# Patient Record
Sex: MALE | Race: WHITE | NOT HISPANIC OR LATINO | Employment: FULL TIME | URBAN - METROPOLITAN AREA
[De-identification: names, ages, dates, MRNs, and addresses within clinical notes are randomized per-mention and may not be internally consistent; named-entity substitution may affect disease eponyms.]

---

## 2019-02-04 ENCOUNTER — OFFICE VISIT (OUTPATIENT)
Dept: URGENT CARE | Facility: MEDICAL CENTER | Age: 34
End: 2019-02-04
Payer: COMMERCIAL

## 2019-02-04 VITALS
TEMPERATURE: 98.9 F | WEIGHT: 231.8 LBS | RESPIRATION RATE: 18 BRPM | SYSTOLIC BLOOD PRESSURE: 130 MMHG | HEART RATE: 100 BPM | BODY MASS INDEX: 31.4 KG/M2 | OXYGEN SATURATION: 100 % | DIASTOLIC BLOOD PRESSURE: 98 MMHG | HEIGHT: 72 IN

## 2019-02-04 DIAGNOSIS — R10.9 LEFT FLANK PAIN: Primary | ICD-10-CM

## 2019-02-04 LAB
SL AMB  POCT GLUCOSE, UA: NORMAL
SL AMB LEUKOCYTE ESTERASE,UA: NORMAL
SL AMB POCT BILIRUBIN,UA: NORMAL
SL AMB POCT BLOOD,UA: NORMAL
SL AMB POCT CLARITY,UA: YELLOW
SL AMB POCT COLOR,UA: CLEAR
SL AMB POCT KETONES,UA: NORMAL
SL AMB POCT NITRITE,UA: NORMAL
SL AMB POCT PH,UA: 6
SL AMB POCT SPECIFIC GRAVITY,UA: 1.03
SL AMB POCT URINE PROTEIN: NORMAL
SL AMB POCT UROBILINOGEN: 0.2

## 2019-02-04 PROCEDURE — G0382 LEV 3 HOSP TYPE B ED VISIT: HCPCS | Performed by: PHYSICIAN ASSISTANT

## 2019-02-04 NOTE — PROGRESS NOTES
[facility]      NAME: Ranjit Mujica is a 34 y o  male  : 1985    MRN: [id number]  DATE: 2019  TIME: 6:11 PM    Assessment and Plan   Left flank pain [R10 9]  1  Left flank pain  POCT urine dip       Patient Instructions     Urine dip, was within normal limits on evaluation today  Likely muscular in nature  Advised to continue taking Tylenol/Motrin  Follow up with PCP in 3-5 days  Proceed to  ER if symptoms worsen  Chief Complaint     Chief Complaint   Patient presents with    Flank Pain         History of Present Illness   Melanie Langford presents to the clinic c/o      Patient twisted awkwardly, while on ice 2 days ago  Ever since he had been having left-sided flank pain, she is concerned about  He denies any urinary issues, chest pain, nausea, fever chills  He has never had a kidney stone before, no family history kidney stones  No family history personal history any kidney issues  Denies any dysuria, hematuria  Review of Systems   Review of Systems   Respiratory: Negative  Cardiovascular: Negative  Gastrointestinal: Positive for abdominal pain  Current Medications     No long-term prescriptions on file  Current Allergies     Allergies as of 2019    (No Known Allergies)            The following portions of the patient's history were reviewed and updated as appropriate: allergies, current medications, past family history, past medical history, past social history, past surgical history and problem list     HISTORICAL INFO:  History reviewed  No pertinent past medical history  History reviewed  No pertinent surgical history  Objective   /98   Pulse 100   Temp 98 9 °F (37 2 °C) (Temporal)   Resp 18   Ht 6' (1 829 m)   Wt 105 kg (231 lb 12 8 oz)   SpO2 100%   BMI 31 44 kg/m²        Physical Exam     Physical Exam   Constitutional: He appears well-developed and well-nourished  No distress     HENT:   Head: Normocephalic and atraumatic  Cardiovascular: Normal rate, regular rhythm and normal heart sounds  Pulmonary/Chest: Effort normal and breath sounds normal  No respiratory distress  He has no wheezes  He has no rales  Abdominal: Normal appearance and bowel sounds are normal  There is no tenderness  There is no CVA tenderness  Noted muscle spasm the left lateral abdominal wall  No visible gross deformities  CVA nontender to palpation  Skin: Skin is warm and dry  He is not diaphoretic  Nursing note and vitals reviewed  M*Modal software was used to dictate this note  It may contain errors with dictating incorrect words/spelling  Please contact provider directly for any questions

## 2019-02-04 NOTE — PATIENT INSTRUCTIONS
Flank Pain   WHAT YOU NEED TO KNOW:   Flank pain is felt in the area below your ribcage and above your hip bones, often in the lower back  Your pain may be dull or so severe that you cannot get comfortable  The pain may stay in one area or radiate to another area  It may worsen and lighten in waves  Flank pain is often a sign of problems with your urinary tract, such as a kidney stone or infection  DISCHARGE INSTRUCTIONS:   Return to the emergency department if:   · You have a fever  · Your heart is fluttering or jumping  · You see blood in your urine  · Your pain radiates into your lower abdomen and genital area  · You have intense pain in your low back next to your spine  · You are much more tired than usual and have no desire to eat  · You have a headache and your muscles jerk  Contact your healthcare provider if:   · You have an upset stomach and are vomiting  · You have to urinate more often, and with urgency  · Your pain worsens or does not improve, and you cannot get comfortable  · You pass a stone when you urinate  · You have questions or concerns about your condition or care  Medicines: The following medicines may be ordered for you:  · Pain medicine  may help decrease or relieve your pain  Do not wait until the pain is severe before you take your medicine  · Antibiotics  may help treat a urinary tract infection caused by bacteria  · Take your medicine as directed  Contact your healthcare provider if you think your medicine is not helping or if you have side effects  Tell him of her if you are allergic to any medicine  Keep a list of the medicines, vitamins, and herbs you take  Include the amounts, and when and why you take them  Bring the list or the pill bottles to follow-up visits  Carry your medicine list with you in case of an emergency    Follow up with your healthcare provider in 1 to 2 days or as directed:  Write down your questions so you remember to ask them during your visits  © 2017 [facility] Information is for End User's use only and may not be sold, redistributed or otherwise used for commercial purposes  All illustrations and images included in CareNotes® are the copyrighted property of A D A M , Inc  or [employer]  The above information is an  only  It is not intended as medical advice for individual conditions or treatments  Talk to your doctor, nurse or pharmacist before following any medical regimen to see if it is safe and effective for you

## 2019-02-14 ENCOUNTER — OFFICE VISIT (OUTPATIENT)
Dept: FAMILY MEDICINE CLINIC | Facility: CLINIC | Age: 34
End: 2019-02-14
Payer: COMMERCIAL

## 2019-02-14 VITALS
HEIGHT: 72 IN | DIASTOLIC BLOOD PRESSURE: 92 MMHG | RESPIRATION RATE: 18 BRPM | HEART RATE: 78 BPM | SYSTOLIC BLOOD PRESSURE: 136 MMHG | OXYGEN SATURATION: 97 % | WEIGHT: 234 LBS | BODY MASS INDEX: 31.69 KG/M2

## 2019-02-14 DIAGNOSIS — L72.3 SEBACEOUS CYST: ICD-10-CM

## 2019-02-14 DIAGNOSIS — Z13.220 LIPID SCREENING: ICD-10-CM

## 2019-02-14 DIAGNOSIS — Z76.89 ENCOUNTER TO ESTABLISH CARE: Primary | ICD-10-CM

## 2019-02-14 DIAGNOSIS — Z72.0 NICOTINE ABUSE: ICD-10-CM

## 2019-02-14 DIAGNOSIS — E66.09 CLASS 1 OBESITY DUE TO EXCESS CALORIES WITHOUT SERIOUS COMORBIDITY WITH BODY MASS INDEX (BMI) OF 31.0 TO 31.9 IN ADULT: ICD-10-CM

## 2019-02-14 DIAGNOSIS — F43.8 STRESS-RELATED PHYSIOLOGICAL RESPONSE AFFECTING PHYSICAL CONDITION: ICD-10-CM

## 2019-02-14 PROBLEM — E66.811 CLASS 1 OBESITY DUE TO EXCESS CALORIES WITHOUT SERIOUS COMORBIDITY WITH BODY MASS INDEX (BMI) OF 31.0 TO 31.9 IN ADULT: Status: ACTIVE | Noted: 2019-02-14

## 2019-02-14 PROBLEM — F43.89 STRESS-RELATED PHYSIOLOGICAL RESPONSE AFFECTING PHYSICAL CONDITION: Status: ACTIVE | Noted: 2019-02-14

## 2019-02-14 PROCEDURE — 3008F BODY MASS INDEX DOCD: CPT | Performed by: NURSE PRACTITIONER

## 2019-02-14 PROCEDURE — 99204 OFFICE O/P NEW MOD 45 MIN: CPT | Performed by: NURSE PRACTITIONER

## 2019-02-14 RX ORDER — ALPRAZOLAM 0.25 MG/1
0.25 TABLET ORAL DAILY PRN
Qty: 12 TABLET | Refills: 0 | Status: SHIPPED | OUTPATIENT
Start: 2019-02-14 | End: 2019-05-15

## 2019-02-14 NOTE — PROGRESS NOTES
Assessment/Plan:    No problem-specific Assessment & Plan notes found for this encounter  Diagnoses and all orders for this visit:    Encounter to establish care  -     Comprehensive metabolic panel; Future  -     Lipid panel; Future    Nicotine abuse  -     Comprehensive metabolic panel; Future  -     Lipid panel; Future    Stress-related physiological response affecting physical condition  -     ALPRAZolam (XANAX) 0 25 mg tablet; Take 1 tablet (0 25 mg total) by mouth daily as needed for anxiety for up to 90 days  -     Comprehensive metabolic panel; Future  -     Lipid panel; Future    Sebaceous cyst  -     Ambulatory referral to Dermatology; Future  -     Comprehensive metabolic panel; Future  -     Lipid panel; Future    Class 1 obesity due to excess calories without serious comorbidity with body mass index (BMI) of 31 0 to 31 9 in adult  -     Comprehensive metabolic panel; Future  -     Lipid panel; Future    Lipid screening  -     Comprehensive metabolic panel; Future  -     Lipid panel; Future          Subjective:      Patient ID: Usha Hansen is a 34 y o  male  Patient is here to become Established  He works in IT service  He has some anxiety, usually on Monday mornings  He has some symptoms of anxiety including fleeting nausea and racing thoughts  On Monday am, he has severe anxiety and subsequent nausea  He is then fine the rest of the week  This lasts for about 10 minutes  He works from home  He originally made this appmnt when he had left hip pain, but that was muscle strain and has resolved  He has not had routine labs  He does smoke  He has been using gum and nicotine patches  Pt also has a sebaceous cyst in his groin area that he would like removed  The following portions of the patient's history were reviewed and updated as appropriate:   He  has a past medical history of Anxiety    He   Patient Active Problem List    Diagnosis Date Noted    Lipid screening 02/14/2019    Nicotine abuse 02/14/2019    Stress-related physiological response affecting physical condition 02/14/2019    Sebaceous cyst 02/14/2019    Class 1 obesity due to excess calories without serious comorbidity with body mass index (BMI) of 31 0 to 31 9 in adult 02/14/2019     He  has no past surgical history on file  His family history includes Hypertension in his father  He  reports that he has been smoking  He has a 4 00 pack-year smoking history  He has never used smokeless tobacco  His alcohol and drug histories are not on file  Current Outpatient Medications   Medication Sig Dispense Refill    ALPRAZolam (XANAX) 0 25 mg tablet Take 1 tablet (0 25 mg total) by mouth daily as needed for anxiety for up to 90 days 12 tablet 0     No current facility-administered medications for this visit  No current outpatient medications on file prior to visit  No current facility-administered medications on file prior to visit  He has No Known Allergies       Review of Systems   Constitutional: Negative for fatigue and fever  HENT: Negative for congestion  Eyes: Negative for visual disturbance  Respiratory: Negative for cough and shortness of breath  Cardiovascular: Negative for chest pain, palpitations and leg swelling  Gastrointestinal: Negative for abdominal distention and abdominal pain  Some nausea with vomitting, anxiety related  Endocrine: Negative for cold intolerance, polydipsia and polyuria  Genitourinary: Negative for difficulty urinating  Musculoskeletal: Negative for back pain and joint swelling  Skin: Negative for color change and rash  Sebaceous cyst in groin area   Allergic/Immunologic: Negative for immunocompromised state  Neurological: Negative for dizziness and headaches  Hematological: Negative for adenopathy  Psychiatric/Behavioral: Negative for behavioral problems and sleep disturbance  The patient is nervous/anxious      All other systems reviewed and are negative  Objective:      /92 (BP Location: Left arm, Patient Position: Sitting)   Pulse 78   Resp 18   Ht 6' (1 829 m)   Wt 106 kg (234 lb)   SpO2 97%   BMI 31 74 kg/m²          Physical Exam   Constitutional: He is oriented to person, place, and time  He appears well-developed and well-nourished  No distress  HENT:   Head: Normocephalic and atraumatic  Right Ear: External ear normal    Left Ear: External ear normal    Nose: Nose normal    Mouth/Throat: Oropharynx is clear and moist  No oropharyngeal exudate  Eyes: Pupils are equal, round, and reactive to light  Conjunctivae and EOM are normal  Right eye exhibits no discharge  Left eye exhibits no discharge  No scleral icterus  Neck: Normal range of motion  Neck supple  No JVD present  No thyromegaly present  Cardiovascular: Normal rate, regular rhythm, normal heart sounds and intact distal pulses  Exam reveals no gallop and no friction rub  No murmur heard  Pulmonary/Chest: Effort normal and breath sounds normal  No stridor  No respiratory distress  He has no wheezes  He has no rales  He exhibits no tenderness  Abdominal: Soft  Bowel sounds are normal  He exhibits no distension and no mass  There is no tenderness  There is no guarding  Musculoskeletal: Normal range of motion  He exhibits no edema, tenderness or deformity  Lymphadenopathy:     He has no cervical adenopathy  Neurological: He is alert and oriented to person, place, and time  No cranial nerve deficit  Coordination normal    Skin: Skin is warm and dry  He is not diaphoretic  No erythema  Psychiatric: He has a normal mood and affect  His behavior is normal  Judgment and thought content normal    Nursing note and vitals reviewed

## 2019-02-14 NOTE — PATIENT INSTRUCTIONS
Sebaceous cyst- refer to Dermatology  Borderline high BP- limit salt in diet  Daily exercise and weight loss  Obtain routine labs to check lipids and glucose  Nicotine addiction- pt will consider Chantix, but for now using patches and gum  Obesity- advised to lose 35 pounds  Needs to limit calories and carbohydrates  Encouraged daily exercise  Physiologic stress response- try taking one xanax on Monday mornings  Follow up in 3 months for BP check

## 2020-01-28 LAB
EXTERNAL CHLAMYDIA RESULT: NOT DETECTED
EXTERNAL HIV SCREEN: NORMAL
HCV AB SER-ACNC: NON REACTIVE
N GONORRHOEA RRNA SPEC QL PROBE: NOT DETECTED

## 2021-02-25 ENCOUNTER — OFFICE VISIT (OUTPATIENT)
Dept: URGENT CARE | Facility: CLINIC | Age: 36
End: 2021-02-25
Payer: COMMERCIAL

## 2021-02-25 VITALS
BODY MASS INDEX: 31.74 KG/M2 | OXYGEN SATURATION: 97 % | WEIGHT: 234 LBS | TEMPERATURE: 97.3 F | SYSTOLIC BLOOD PRESSURE: 138 MMHG | HEART RATE: 109 BPM | DIASTOLIC BLOOD PRESSURE: 80 MMHG | RESPIRATION RATE: 16 BRPM

## 2021-02-25 DIAGNOSIS — R10.32 LEFT LOWER QUADRANT ABDOMINAL PAIN: Primary | ICD-10-CM

## 2021-02-25 LAB
SL AMB  POCT GLUCOSE, UA: ABNORMAL
SL AMB LEUKOCYTE ESTERASE,UA: ABNORMAL
SL AMB POCT BILIRUBIN,UA: ABNORMAL
SL AMB POCT BLOOD,UA: ABNORMAL
SL AMB POCT CLARITY,UA: ABNORMAL
SL AMB POCT COLOR,UA: ABNORMAL
SL AMB POCT KETONES,UA: ABNORMAL
SL AMB POCT NITRITE,UA: ABNORMAL
SL AMB POCT PH,UA: 5
SL AMB POCT SPECIFIC GRAVITY,UA: 1.03
SL AMB POCT URINE PROTEIN: 30
SL AMB POCT UROBILINOGEN: 0.2

## 2021-02-25 PROCEDURE — G0382 LEV 3 HOSP TYPE B ED VISIT: HCPCS | Performed by: PHYSICIAN ASSISTANT

## 2021-02-25 PROCEDURE — 81002 URINALYSIS NONAUTO W/O SCOPE: CPT | Performed by: PHYSICIAN ASSISTANT

## 2021-02-25 PROCEDURE — 87086 URINE CULTURE/COLONY COUNT: CPT | Performed by: PHYSICIAN ASSISTANT

## 2021-02-25 RX ORDER — CIPROFLOXACIN 500 MG/1
500 TABLET, FILM COATED ORAL EVERY 12 HOURS SCHEDULED
Qty: 6 TABLET | Refills: 0 | Status: SHIPPED | OUTPATIENT
Start: 2021-02-25 | End: 2021-02-28

## 2021-02-25 NOTE — PROGRESS NOTES
[facility]        NAME: Andreina Dunaway is a 37 y o  male  : 1985    MRN: [id number]  DATE: 2021  TIME: 10:29 AM    Assessment and Plan   Left lower quadrant abdominal pain [R10 32]  1  Left lower quadrant abdominal pain  POCT urine dip    ciprofloxacin (CIPRO) 500 mg tablet    Urine culture         Patient Instructions   Patient Instructions   Abnormal UA, Possible UTI as this presentation of symptoms is similar to past UTIs  Will treat w/ Cipro and confrim with urine culture  Increase oral hydration  Follow up with PCP  Discussed low suspicion for more concerning causes of abdominal pain such as infectious gastroenteritis, diverticulitis, appendicitis, peritonitis  Symptoms reported are improving  Patient aware of worsening symptoms and will go immediately to the nearest ED if new or worsening symptoms develop  Abdominal Pain   WHAT YOU NEED TO KNOW:   Abdominal pain can be dull, achy, or sharp  You may have pain in one area of your abdomen, or in your entire abdomen  Your pain may be caused by a condition such as constipation, food sensitivity or poisoning, infection, or a blockage  Abdominal pain can also be from a hernia, appendicitis, or an ulcer  Liver, gallbladder, or kidney conditions can also cause abdominal pain  The cause of your abdominal pain may be unknown  DISCHARGE INSTRUCTIONS:   Return to the emergency department if:   · You have new chest pain or shortness of breath  · You have pulsing pain in your upper abdomen or lower back that suddenly becomes constant  · Your pain is in the right lower abdominal area and worsens with movement  · You have a fever over 100 4°F (38°C) or shaking chills  · You are vomiting and cannot keep food or liquids down  · Your pain does not improve or gets worse over the next 8 to 12 hours  · You see blood in your vomit or bowel movements, or they look black and tarry       · Your skin or the whites of your eyes turn yellow  · You are a woman and have a large amount of vaginal bleeding that is not your monthly period  Contact your healthcare provider if:   · You have pain in your lower back  · You are a man and have pain in your testicles  · You have pain when you urinate  · You have questions or concerns about your condition or care  Follow up with your healthcare provider within 24 hours or as directed:  Write down your questions so you remember to ask them during your visits  Medicines:   · Medicines  may be given to calm your stomach and prevent vomiting or to decrease pain  Ask how to take pain medicine safely  · Take your medicine as directed  Contact your healthcare provider if you think your medicine is not helping or if you have side effects  Tell him of her if you are allergic to any medicine  Keep a list of the medicines, vitamins, and herbs you take  Include the amounts, and when and why you take them  Bring the list or the pill bottles to follow-up visits  Carry your medicine list with you in case of an emergency  © Copyright [address] Information is for End User's use only and may not be sold, redistributed or otherwise used for commercial purposes  All illustrations and images included in CareNotes® are the copyrighted property of A D A M , Inc  or [address] [place] [address]  The above information is an  only  It is not intended as medical advice for individual conditions or treatments  Talk to your doctor, nurse or pharmacist before following any medical regimen to see if it is safe and effective for you  Follow up with PCP in 3-5 days  Proceed to  ER if symptoms worsen  Chief Complaint     Chief Complaint   Patient presents with    Abdominal Pain     LLQ/pelvic pain x 3 days  Pt had a slight fever yestreday of 100 5  Also felt bloated, abdominal pressure, and gassy   Then had small amount of blood in stool this AM         History of Present Illness 79-year-old male presents to clinic with complaints of left lower abdominal pain x2 days  Reports loss of appetite, dull ache in his left lower quadrant and fever yesterday 100 5° F  He reports his symptoms have been improving but he was concerned as he saw little bit of blood in the toilet this morning  after having a bowel movement  He denies any diarrhea, constipation, body aches or chills, nausea or vomiting , black stools, rectal   He admits to not drinking much water  He reports history of hemorrhoids years ago  He reports having 2 urinary tract infections in the past that were similar to his symptoms today  Denies history of GI disease, abdominal surgeries  He did have 1 episode of burning with urination yesterday  Patient is monogamous and denies concerns for sexually transmitted infections  He denies any recent travel  Review of Systems   Review of Systems   Constitutional: Positive for appetite change, fatigue and fever  Negative for chills, diaphoresis and unexpected weight change  HENT: Negative  Respiratory: Negative for cough and shortness of breath  Cardiovascular: Negative for chest pain  Gastrointestinal: Positive for abdominal pain and blood in stool  Negative for abdominal distention, anal bleeding, constipation, diarrhea, nausea, rectal pain and vomiting  Genitourinary: Positive for dysuria  Negative for difficulty urinating, discharge, flank pain, frequency, genital sores, hematuria, penile pain, penile swelling, scrotal swelling, testicular pain and urgency  Musculoskeletal: Negative for arthralgias and myalgias  Skin: Negative for rash  Neurological: Negative for dizziness, light-headedness and headaches  Hematological: Negative for adenopathy           Current Medications       Current Outpatient Medications:     ALPRAZolam (XANAX) 0 25 mg tablet, Take 1 tablet (0 25 mg total) by mouth daily as needed for anxiety for up to 90 days, Disp: 12 tablet, Rfl: 0    ciprofloxacin (CIPRO) 500 mg tablet, Take 1 tablet (500 mg total) by mouth every 12 (twelve) hours for 3 days, Disp: 6 tablet, Rfl: 0    Current Allergies     Allergies as of 02/25/2021    (No Known Allergies)            The following portions of the patient's history were reviewed and updated as appropriate: allergies, current medications, past family history, past medical history, past social history, past surgical history and problem list      Past Medical History:   Diagnosis Date    Anxiety        History reviewed  No pertinent surgical history  Family History   Problem Relation Age of Onset    Hypertension Father          Medications have been verified  Objective   /80   Pulse (!) 109   Temp (!) 97 3 °F (36 3 °C) (Temporal)   Resp 16   Wt 106 kg (234 lb)   SpO2 97%   BMI 31 74 kg/m²        Physical Exam     Physical Exam  Vitals signs and nursing note reviewed  Constitutional:       General: He is not in acute distress  Appearance: He is well-developed  He is not ill-appearing or diaphoretic  HENT:      Head: Normocephalic and atraumatic  Cardiovascular:      Rate and Rhythm: Regular rhythm  Tachycardia present  Heart sounds: Normal heart sounds  Pulmonary:      Effort: Pulmonary effort is normal       Breath sounds: Normal breath sounds  Abdominal:      General: Abdomen is flat  Bowel sounds are normal  There is no distension  Palpations: Abdomen is soft  There is no mass  Tenderness: There is abdominal tenderness in the suprapubic area and left lower quadrant  There is no right CVA tenderness, left CVA tenderness, guarding or rebound  Skin:     General: Skin is warm and dry  Findings: No rash  Neurological:      Mental Status: He is alert

## 2021-02-25 NOTE — PATIENT INSTRUCTIONS
Abnormal UA, Possible UTI as this presentation of symptoms is similar to past UTIs  Will treat w/ Cipro and confrim with urine culture  Increase oral hydration  Follow up with PCP  Discussed low suspicion for more concerning causes of abdominal pain such as infectious gastroenteritis, diverticulitis, appendicitis, peritonitis  Symptoms reported are improving  Patient aware of worsening symptoms and will go immediately to the nearest ED if new or worsening symptoms develop  Abdominal Pain   WHAT YOU NEED TO KNOW:   Abdominal pain can be dull, achy, or sharp  You may have pain in one area of your abdomen, or in your entire abdomen  Your pain may be caused by a condition such as constipation, food sensitivity or poisoning, infection, or a blockage  Abdominal pain can also be from a hernia, appendicitis, or an ulcer  Liver, gallbladder, or kidney conditions can also cause abdominal pain  The cause of your abdominal pain may be unknown  DISCHARGE INSTRUCTIONS:   Return to the emergency department if:   · You have new chest pain or shortness of breath  · You have pulsing pain in your upper abdomen or lower back that suddenly becomes constant  · Your pain is in the right lower abdominal area and worsens with movement  · You have a fever over 100 4°F (38°C) or shaking chills  · You are vomiting and cannot keep food or liquids down  · Your pain does not improve or gets worse over the next 8 to 12 hours  · You see blood in your vomit or bowel movements, or they look black and tarry  · Your skin or the whites of your eyes turn yellow  · You are a woman and have a large amount of vaginal bleeding that is not your monthly period  Contact your healthcare provider if:   · You have pain in your lower back  · You are a man and have pain in your testicles  · You have pain when you urinate  · You have questions or concerns about your condition or care      Follow up with your healthcare provider within 24 hours or as directed:  Write down your questions so you remember to ask them during your visits  Medicines:   · Medicines  may be given to calm your stomach and prevent vomiting or to decrease pain  Ask how to take pain medicine safely  · Take your medicine as directed  Contact your healthcare provider if you think your medicine is not helping or if you have side effects  Tell him of her if you are allergic to any medicine  Keep a list of the medicines, vitamins, and herbs you take  Include the amounts, and when and why you take them  Bring the list or the pill bottles to follow-up visits  Carry your medicine list with you in case of an emergency  © Copyright [address] Information is for End User's use only and may not be sold, redistributed or otherwise used for commercial purposes  All illustrations and images included in CareNotes® are the copyrighted property of [employer]  or [employer]  The above information is an  only  It is not intended as medical advice for individual conditions or treatments  Talk to your doctor, nurse or pharmacist before following any medical regimen to see if it is safe and effective for you

## 2021-02-26 LAB — BACTERIA UR CULT: NORMAL

## 2021-03-30 DIAGNOSIS — Z23 ENCOUNTER FOR IMMUNIZATION: ICD-10-CM

## 2021-04-01 ENCOUNTER — IMMUNIZATIONS (OUTPATIENT)
Dept: FAMILY MEDICINE CLINIC | Facility: HOSPITAL | Age: 36
End: 2021-04-01

## 2021-04-01 DIAGNOSIS — Z23 ENCOUNTER FOR IMMUNIZATION: Primary | ICD-10-CM

## 2021-04-01 PROCEDURE — 91300 SARS-COV-2 / COVID-19 MRNA VACCINE (PFIZER-BIONTECH) 30 MCG: CPT

## 2021-04-01 PROCEDURE — 0001A SARS-COV-2 / COVID-19 MRNA VACCINE (PFIZER-BIONTECH) 30 MCG: CPT

## 2021-04-24 ENCOUNTER — IMMUNIZATIONS (OUTPATIENT)
Dept: FAMILY MEDICINE CLINIC | Facility: HOSPITAL | Age: 36
End: 2021-04-24

## 2021-04-24 DIAGNOSIS — Z23 ENCOUNTER FOR IMMUNIZATION: Primary | ICD-10-CM

## 2021-04-24 PROCEDURE — 0002A SARS-COV-2 / COVID-19 MRNA VACCINE (PFIZER-BIONTECH) 30 MCG: CPT

## 2021-04-24 PROCEDURE — 91300 SARS-COV-2 / COVID-19 MRNA VACCINE (PFIZER-BIONTECH) 30 MCG: CPT

## 2021-12-28 ENCOUNTER — IMMUNIZATIONS (OUTPATIENT)
Dept: FAMILY MEDICINE CLINIC | Facility: HOSPITAL | Age: 36
End: 2021-12-28

## 2021-12-28 DIAGNOSIS — Z23 ENCOUNTER FOR IMMUNIZATION: Primary | ICD-10-CM

## 2021-12-28 PROCEDURE — 91300 COVID-19 PFIZER VACC 0.3 ML: CPT

## 2021-12-28 PROCEDURE — 0001A COVID-19 PFIZER VACC 0.3 ML: CPT

## 2024-02-21 PROBLEM — Z13.220 LIPID SCREENING: Status: RESOLVED | Noted: 2019-02-14 | Resolved: 2024-02-21

## 2024-04-03 ENCOUNTER — APPOINTMENT (EMERGENCY)
Dept: CT IMAGING | Facility: HOSPITAL | Age: 39
DRG: 720 | End: 2024-04-03
Payer: COMMERCIAL

## 2024-04-03 ENCOUNTER — HOSPITAL ENCOUNTER (INPATIENT)
Facility: HOSPITAL | Age: 39
LOS: 3 days | Discharge: HOME | DRG: 720 | End: 2024-04-07
Attending: EMERGENCY MEDICINE | Admitting: SURGERY
Payer: COMMERCIAL

## 2024-04-03 ENCOUNTER — APPOINTMENT (EMERGENCY)
Dept: RADIOLOGY | Facility: HOSPITAL | Age: 39
DRG: 720 | End: 2024-04-03
Payer: COMMERCIAL

## 2024-04-03 ENCOUNTER — OFFICE VISIT (OUTPATIENT)
Dept: URGENT CARE | Facility: CLINIC | Age: 39
End: 2024-04-03
Payer: COMMERCIAL

## 2024-04-03 VITALS
TEMPERATURE: 96 F | DIASTOLIC BLOOD PRESSURE: 84 MMHG | OXYGEN SATURATION: 98 % | SYSTOLIC BLOOD PRESSURE: 113 MMHG | HEART RATE: 145 BPM

## 2024-04-03 DIAGNOSIS — A41.9 SEPSIS (HCC): Primary | ICD-10-CM

## 2024-04-03 DIAGNOSIS — R10.32 LEFT LOWER QUADRANT ABDOMINAL PAIN: Primary | ICD-10-CM

## 2024-04-03 DIAGNOSIS — K57.92 DIVERTICULITIS: ICD-10-CM

## 2024-04-03 DIAGNOSIS — R10.9 ABDOMINAL PAIN: ICD-10-CM

## 2024-04-03 LAB
ALBUMIN SERPL BCP-MCNC: 4.6 G/DL (ref 3.5–5)
ALP SERPL-CCNC: 111 U/L (ref 34–104)
ALT SERPL W P-5'-P-CCNC: 20 U/L (ref 7–52)
ANION GAP SERPL CALCULATED.3IONS-SCNC: 11 MMOL/L (ref 4–13)
APTT PPP: 34 SECONDS (ref 23–37)
AST SERPL W P-5'-P-CCNC: 10 U/L (ref 13–39)
BACTERIA UR QL AUTO: NORMAL /HPF
BASOPHILS # BLD AUTO: 0.06 THOUSANDS/ÂΜL (ref 0–0.1)
BASOPHILS NFR BLD AUTO: 0 % (ref 0–1)
BILIRUB SERPL-MCNC: 1.83 MG/DL (ref 0.2–1)
BILIRUB UR QL STRIP: NEGATIVE
BNP SERPL-MCNC: 27 PG/ML (ref 0–100)
BUN SERPL-MCNC: 11 MG/DL (ref 5–25)
CALCIUM SERPL-MCNC: 9.2 MG/DL (ref 8.4–10.2)
CARDIAC TROPONIN I PNL SERPL HS: 5 NG/L
CHLORIDE SERPL-SCNC: 100 MMOL/L (ref 96–108)
CLARITY UR: CLEAR
CO2 SERPL-SCNC: 23 MMOL/L (ref 21–32)
COLOR UR: ABNORMAL
CREAT SERPL-MCNC: 1.24 MG/DL (ref 0.6–1.3)
EOSINOPHIL # BLD AUTO: 0.01 THOUSAND/ÂΜL (ref 0–0.61)
EOSINOPHIL NFR BLD AUTO: 0 % (ref 0–6)
ERYTHROCYTE [DISTWIDTH] IN BLOOD BY AUTOMATED COUNT: 12.7 % (ref 11.6–15.1)
FLUAV RNA RESP QL NAA+PROBE: NEGATIVE
FLUBV RNA RESP QL NAA+PROBE: NEGATIVE
GFR SERPL CREATININE-BSD FRML MDRD: 74 ML/MIN/1.73SQ M
GLUCOSE SERPL-MCNC: 123 MG/DL (ref 65–140)
GLUCOSE UR STRIP-MCNC: NEGATIVE MG/DL
HCT VFR BLD AUTO: 47.8 % (ref 36.5–49.3)
HGB BLD-MCNC: 16.4 G/DL (ref 12–17)
HGB UR QL STRIP.AUTO: ABNORMAL
IMM GRANULOCYTES # BLD AUTO: 0.15 THOUSAND/UL (ref 0–0.2)
IMM GRANULOCYTES NFR BLD AUTO: 1 % (ref 0–2)
INR PPP: 1.11 (ref 0.84–1.19)
KETONES UR STRIP-MCNC: ABNORMAL MG/DL
LACTATE SERPL-SCNC: 1.8 MMOL/L (ref 0.5–2)
LEUKOCYTE ESTERASE UR QL STRIP: NEGATIVE
LIPASE SERPL-CCNC: <6 U/L (ref 11–82)
LYMPHOCYTES # BLD AUTO: 2.22 THOUSANDS/ÂΜL (ref 0.6–4.47)
LYMPHOCYTES NFR BLD AUTO: 8 % (ref 14–44)
MCH RBC QN AUTO: 30.1 PG (ref 26.8–34.3)
MCHC RBC AUTO-ENTMCNC: 34.3 G/DL (ref 31.4–37.4)
MCV RBC AUTO: 88 FL (ref 82–98)
MONOCYTES # BLD AUTO: 2.37 THOUSAND/ÂΜL (ref 0.17–1.22)
MONOCYTES NFR BLD AUTO: 9 % (ref 4–12)
NEUTROPHILS # BLD AUTO: 21.56 THOUSANDS/ÂΜL (ref 1.85–7.62)
NEUTS SEG NFR BLD AUTO: 82 % (ref 43–75)
NITRITE UR QL STRIP: NEGATIVE
NON-SQ EPI CELLS URNS QL MICRO: NORMAL /HPF
NRBC BLD AUTO-RTO: 0 /100 WBCS
PH UR STRIP.AUTO: 7 [PH]
PLATELET # BLD AUTO: 267 THOUSANDS/UL (ref 149–390)
PMV BLD AUTO: 10.2 FL (ref 8.9–12.7)
POTASSIUM SERPL-SCNC: 3.9 MMOL/L (ref 3.5–5.3)
PROCALCITONIN SERPL-MCNC: 2.8 NG/ML
PROT SERPL-MCNC: 8.1 G/DL (ref 6.4–8.4)
PROT UR STRIP-MCNC: ABNORMAL MG/DL
PROTHROMBIN TIME: 15 SECONDS (ref 11.6–14.5)
RBC # BLD AUTO: 5.45 MILLION/UL (ref 3.88–5.62)
RBC #/AREA URNS AUTO: NORMAL /HPF
RSV RNA RESP QL NAA+PROBE: NEGATIVE
SARS-COV-2 RNA RESP QL NAA+PROBE: NEGATIVE
SODIUM SERPL-SCNC: 134 MMOL/L (ref 135–147)
SP GR UR STRIP.AUTO: >=1.05 (ref 1–1.03)
UROBILINOGEN UR STRIP-ACNC: 2 MG/DL
WBC # BLD AUTO: 26.37 THOUSAND/UL (ref 4.31–10.16)
WBC #/AREA URNS AUTO: NORMAL /HPF

## 2024-04-03 PROCEDURE — 71045 X-RAY EXAM CHEST 1 VIEW: CPT

## 2024-04-03 PROCEDURE — 99213 OFFICE O/P EST LOW 20 MIN: CPT

## 2024-04-03 PROCEDURE — 96375 TX/PRO/DX INJ NEW DRUG ADDON: CPT

## 2024-04-03 PROCEDURE — 81001 URINALYSIS AUTO W/SCOPE: CPT

## 2024-04-03 PROCEDURE — 84145 PROCALCITONIN (PCT): CPT

## 2024-04-03 PROCEDURE — 36415 COLL VENOUS BLD VENIPUNCTURE: CPT

## 2024-04-03 PROCEDURE — 96365 THER/PROPH/DIAG IV INF INIT: CPT

## 2024-04-03 PROCEDURE — 96361 HYDRATE IV INFUSION ADD-ON: CPT

## 2024-04-03 PROCEDURE — 85025 COMPLETE CBC W/AUTO DIFF WBC: CPT | Performed by: EMERGENCY MEDICINE

## 2024-04-03 PROCEDURE — 83880 ASSAY OF NATRIURETIC PEPTIDE: CPT

## 2024-04-03 PROCEDURE — 83605 ASSAY OF LACTIC ACID: CPT

## 2024-04-03 PROCEDURE — 85610 PROTHROMBIN TIME: CPT

## 2024-04-03 PROCEDURE — S9088 SERVICES PROVIDED IN URGENT: HCPCS

## 2024-04-03 PROCEDURE — 99285 EMERGENCY DEPT VISIT HI MDM: CPT

## 2024-04-03 PROCEDURE — 87040 BLOOD CULTURE FOR BACTERIA: CPT

## 2024-04-03 PROCEDURE — 93005 ELECTROCARDIOGRAM TRACING: CPT

## 2024-04-03 PROCEDURE — 74177 CT ABD & PELVIS W/CONTRAST: CPT

## 2024-04-03 PROCEDURE — 99284 EMERGENCY DEPT VISIT MOD MDM: CPT

## 2024-04-03 PROCEDURE — 84484 ASSAY OF TROPONIN QUANT: CPT

## 2024-04-03 PROCEDURE — 0241U HB NFCT DS VIR RESP RNA 4 TRGT: CPT

## 2024-04-03 PROCEDURE — 83690 ASSAY OF LIPASE: CPT | Performed by: EMERGENCY MEDICINE

## 2024-04-03 PROCEDURE — 85730 THROMBOPLASTIN TIME PARTIAL: CPT

## 2024-04-03 PROCEDURE — 80053 COMPREHEN METABOLIC PANEL: CPT | Performed by: EMERGENCY MEDICINE

## 2024-04-03 RX ORDER — ACETAMINOPHEN 10 MG/ML
1000 INJECTION, SOLUTION INTRAVENOUS ONCE
Status: COMPLETED | OUTPATIENT
Start: 2024-04-03 | End: 2024-04-03

## 2024-04-03 RX ADMIN — PIPERACILLIN AND TAZOBACTAM 4.5 G: 36; 4.5 INJECTION, POWDER, FOR SOLUTION INTRAVENOUS at 23:52

## 2024-04-03 RX ADMIN — ACETAMINOPHEN 1000 MG: 10 INJECTION INTRAVENOUS at 22:42

## 2024-04-03 RX ADMIN — SODIUM CHLORIDE 1000 ML: 0.9 INJECTION, SOLUTION INTRAVENOUS at 23:08

## 2024-04-03 RX ADMIN — IOHEXOL 100 ML: 350 INJECTION, SOLUTION INTRAVENOUS at 22:55

## 2024-04-03 RX ADMIN — SODIUM CHLORIDE 1000 ML: 0.9 INJECTION, SOLUTION INTRAVENOUS at 22:49

## 2024-04-03 NOTE — PROGRESS NOTES
[facility]        NAME: Melanie Langford is a 38 y.o. male  : 1985    MRN: [id number]  DATE: April 3, 2024  TIME: 3:09 PM    Assessment and Plan   Left lower quadrant abdominal pain [R10.32]  1. Left lower quadrant abdominal pain          Mild tenderness to LLQ. HR noted to be elevated. Recommend proceeding to the ER for further evaluation. Patient unsure if he is going to go because the abdominal pain is improving. Concerned with elevated HR.     Patient Instructions     Proceed to the ER for further evaluation.     Chief Complaint     Chief Complaint   Patient presents with    Abdominal Pain     Pt has abdominal pain in his left lower quadrant. Pt said that he feels bloated and swollen in that area and it has happened before twice. Pt said that this pain started yesterday morning. Pt has taken Tums.         History of Present Illness       The patient presents today with complaints of L lower abdominal pain and bloating that started yesterday. He states the pain seems to be improving. Denies fever/chills, diarrhea, or history of GI issues. He states he had this pain previously. He vomited x 1 yesterday. Denies bloody stools but states he has had in the past.         Review of Systems   Review of Systems   Constitutional:  Positive for diaphoresis. Negative for chills and fever.   Cardiovascular:  Negative for chest pain.   Gastrointestinal:  Positive for abdominal distention, abdominal pain and vomiting. Negative for blood in stool, diarrhea and nausea.   Musculoskeletal:  Negative for myalgias.         Current Medications       Current Outpatient Medications:     ALPRAZolam (XANAX) 0.25 mg tablet, Take 1 tablet (0.25 mg total) by mouth daily as needed for anxiety for up to 90 days, Disp: 12 tablet, Rfl: 0    Current Allergies     Allergies as of 2024    (No Known Allergies)            The following portions of the patient's history were reviewed and updated as appropriate: allergies, current  medications, past family history, past medical history, past social history, past surgical history and problem list.     Past Medical History:   Diagnosis Date    Anxiety        History reviewed. No pertinent surgical history.    Family History   Problem Relation Age of Onset    Hypertension Father          Medications have been verified.        Objective   /84 (BP Location: Left arm)   Pulse (!) 145   Temp (!) 96 °F (35.6 °C)   SpO2 98%        Physical Exam     Physical Exam  Vitals and nursing note reviewed.   Constitutional:       General: He is not in acute distress.     Appearance: Normal appearance. He is not ill-appearing.   HENT:      Head: Normocephalic and atraumatic.      Right Ear: Tympanic membrane, ear canal and external ear normal.      Left Ear: Tympanic membrane, ear canal and external ear normal.      Nose: Nose normal. No congestion or rhinorrhea.      Mouth/Throat:      Lips: Pink.      Mouth: Mucous membranes are moist.      Pharynx: No oropharyngeal exudate or posterior oropharyngeal erythema.      Tonsils: No tonsillar exudate.   Eyes:      General: Vision grossly intact.      Extraocular Movements: Extraocular movements intact.      Pupils: Pupils are equal, round, and reactive to light.   Cardiovascular:      Rate and Rhythm: Regular rhythm. Tachycardia present.      Heart sounds: Normal heart sounds. No murmur heard.  Pulmonary:      Effort: Pulmonary effort is normal. No respiratory distress.      Breath sounds: Normal breath sounds. No decreased air movement. No decreased breath sounds, wheezing, rhonchi or rales.   Abdominal:      Tenderness: There is abdominal tenderness (mild) in the left lower quadrant. There is no right CVA tenderness, left CVA tenderness, guarding or rebound.   Musculoskeletal:         General: Normal range of motion.      Cervical back: Normal range of motion.   Lymphadenopathy:      Cervical: No cervical adenopathy.   Skin:     General: Skin is warm.       Findings: No rash.   Neurological:      Mental Status: He is alert and oriented to person, place, and time.      Motor: Motor function is intact.      Gait: Gait is intact.   Psychiatric:         Attention and Perception: Attention normal.         Mood and Affect: Mood normal.

## 2024-04-04 LAB
2HR DELTA HS TROPONIN: -1 NG/L
4HR DELTA HS TROPONIN: -1 NG/L
ABO GROUP BLD: NORMAL
ABO GROUP BLD: NORMAL
ALBUMIN SERPL BCP-MCNC: 3.7 G/DL (ref 3.5–5)
ALP SERPL-CCNC: 90 U/L (ref 34–104)
ALT SERPL W P-5'-P-CCNC: 17 U/L (ref 7–52)
ANION GAP SERPL CALCULATED.3IONS-SCNC: 9 MMOL/L (ref 4–13)
AST SERPL W P-5'-P-CCNC: 11 U/L (ref 13–39)
ATRIAL RATE: 134 BPM
BASOPHILS # BLD AUTO: 0.04 THOUSANDS/ÂΜL (ref 0–0.1)
BASOPHILS NFR BLD AUTO: 0 % (ref 0–1)
BILIRUB SERPL-MCNC: 1.49 MG/DL (ref 0.2–1)
BLD GP AB SCN SERPL QL: NEGATIVE
BUN SERPL-MCNC: 9 MG/DL (ref 5–25)
CALCIUM SERPL-MCNC: 8.4 MG/DL (ref 8.4–10.2)
CARDIAC TROPONIN I PNL SERPL HS: 4 NG/L
CARDIAC TROPONIN I PNL SERPL HS: 4 NG/L
CHLORIDE SERPL-SCNC: 106 MMOL/L (ref 96–108)
CO2 SERPL-SCNC: 22 MMOL/L (ref 21–32)
CREAT SERPL-MCNC: 0.96 MG/DL (ref 0.6–1.3)
EOSINOPHIL # BLD AUTO: 0.01 THOUSAND/ÂΜL (ref 0–0.61)
EOSINOPHIL NFR BLD AUTO: 0 % (ref 0–6)
ERYTHROCYTE [DISTWIDTH] IN BLOOD BY AUTOMATED COUNT: 12.7 % (ref 11.6–15.1)
GFR SERPL CREATININE-BSD FRML MDRD: 101 ML/MIN/1.73SQ M
GLUCOSE SERPL-MCNC: 122 MG/DL (ref 65–140)
HCT VFR BLD AUTO: 41.9 % (ref 36.5–49.3)
HGB BLD-MCNC: 14.4 G/DL (ref 12–17)
IMM GRANULOCYTES # BLD AUTO: 0.12 THOUSAND/UL (ref 0–0.2)
IMM GRANULOCYTES NFR BLD AUTO: 1 % (ref 0–2)
LYMPHOCYTES # BLD AUTO: 1.4 THOUSANDS/ÂΜL (ref 0.6–4.47)
LYMPHOCYTES NFR BLD AUTO: 7 % (ref 14–44)
MAGNESIUM SERPL-MCNC: 1.6 MG/DL (ref 1.9–2.7)
MCH RBC QN AUTO: 29.9 PG (ref 26.8–34.3)
MCHC RBC AUTO-ENTMCNC: 34.4 G/DL (ref 31.4–37.4)
MCV RBC AUTO: 87 FL (ref 82–98)
MONOCYTES # BLD AUTO: 1.47 THOUSAND/ÂΜL (ref 0.17–1.22)
MONOCYTES NFR BLD AUTO: 8 % (ref 4–12)
NEUTROPHILS # BLD AUTO: 16.49 THOUSANDS/ÂΜL (ref 1.85–7.62)
NEUTS SEG NFR BLD AUTO: 84 % (ref 43–75)
NRBC BLD AUTO-RTO: 0 /100 WBCS
P AXIS: 57 DEGREES
PHOSPHATE SERPL-MCNC: 1.6 MG/DL (ref 2.7–4.5)
PLATELET # BLD AUTO: 184 THOUSANDS/UL (ref 149–390)
PLATELET # BLD AUTO: 199 THOUSANDS/UL (ref 149–390)
PMV BLD AUTO: 10 FL (ref 8.9–12.7)
PMV BLD AUTO: 9.9 FL (ref 8.9–12.7)
POTASSIUM SERPL-SCNC: 3.8 MMOL/L (ref 3.5–5.3)
PR INTERVAL: 128 MS
PROT SERPL-MCNC: 6.8 G/DL (ref 6.4–8.4)
QRS AXIS: 96 DEGREES
QRSD INTERVAL: 84 MS
QT INTERVAL: 300 MS
QTC INTERVAL: 448 MS
RBC # BLD AUTO: 4.82 MILLION/UL (ref 3.88–5.62)
RH BLD: POSITIVE
RH BLD: POSITIVE
SODIUM SERPL-SCNC: 137 MMOL/L (ref 135–147)
SPECIMEN EXPIRATION DATE: NORMAL
T WAVE AXIS: -17 DEGREES
VENTRICULAR RATE: 134 BPM
WBC # BLD AUTO: 19.53 THOUSAND/UL (ref 4.31–10.16)

## 2024-04-04 PROCEDURE — 84100 ASSAY OF PHOSPHORUS: CPT | Performed by: SURGERY

## 2024-04-04 PROCEDURE — 93010 ELECTROCARDIOGRAM REPORT: CPT | Performed by: INTERNAL MEDICINE

## 2024-04-04 PROCEDURE — 86850 RBC ANTIBODY SCREEN: CPT | Performed by: SURGERY

## 2024-04-04 PROCEDURE — 85049 AUTOMATED PLATELET COUNT: CPT | Performed by: SURGERY

## 2024-04-04 PROCEDURE — C9113 INJ PANTOPRAZOLE SODIUM, VIA: HCPCS | Performed by: SURGERY

## 2024-04-04 PROCEDURE — 80053 COMPREHEN METABOLIC PANEL: CPT | Performed by: SURGERY

## 2024-04-04 PROCEDURE — 83735 ASSAY OF MAGNESIUM: CPT | Performed by: SURGERY

## 2024-04-04 PROCEDURE — 36415 COLL VENOUS BLD VENIPUNCTURE: CPT

## 2024-04-04 PROCEDURE — 86901 BLOOD TYPING SEROLOGIC RH(D): CPT | Performed by: SURGERY

## 2024-04-04 PROCEDURE — 84484 ASSAY OF TROPONIN QUANT: CPT

## 2024-04-04 PROCEDURE — 86900 BLOOD TYPING SEROLOGIC ABO: CPT | Performed by: SURGERY

## 2024-04-04 PROCEDURE — 85025 COMPLETE CBC W/AUTO DIFF WBC: CPT | Performed by: SURGERY

## 2024-04-04 RX ORDER — PANTOPRAZOLE SODIUM 40 MG/10ML
40 INJECTION, POWDER, LYOPHILIZED, FOR SOLUTION INTRAVENOUS
Status: DISCONTINUED | OUTPATIENT
Start: 2024-04-04 | End: 2024-04-07 | Stop reason: HOSPADM

## 2024-04-04 RX ORDER — HEPARIN SODIUM 5000 [USP'U]/ML
5000 INJECTION, SOLUTION INTRAVENOUS; SUBCUTANEOUS EVERY 8 HOURS SCHEDULED
Status: DISCONTINUED | OUTPATIENT
Start: 2024-04-04 | End: 2024-04-07 | Stop reason: HOSPADM

## 2024-04-04 RX ORDER — SODIUM CHLORIDE, SODIUM LACTATE, POTASSIUM CHLORIDE, CALCIUM CHLORIDE 600; 310; 30; 20 MG/100ML; MG/100ML; MG/100ML; MG/100ML
125 INJECTION, SOLUTION INTRAVENOUS CONTINUOUS
Status: DISCONTINUED | OUTPATIENT
Start: 2024-04-04 | End: 2024-04-05

## 2024-04-04 RX ORDER — ALPRAZOLAM 0.25 MG/1
0.25 TABLET ORAL
Status: DISCONTINUED | OUTPATIENT
Start: 2024-04-04 | End: 2024-04-07 | Stop reason: HOSPADM

## 2024-04-04 RX ORDER — ONDANSETRON 2 MG/ML
4 INJECTION INTRAMUSCULAR; INTRAVENOUS EVERY 6 HOURS PRN
Status: DISCONTINUED | OUTPATIENT
Start: 2024-04-04 | End: 2024-04-07 | Stop reason: HOSPADM

## 2024-04-04 RX ORDER — ACETAMINOPHEN 325 MG/1
975 TABLET ORAL EVERY 8 HOURS PRN
Status: DISCONTINUED | OUTPATIENT
Start: 2024-04-04 | End: 2024-04-07 | Stop reason: HOSPADM

## 2024-04-04 RX ADMIN — PIPERACILLIN AND TAZOBACTAM 4.5 G: 36; 4.5 INJECTION, POWDER, FOR SOLUTION INTRAVENOUS at 05:32

## 2024-04-04 RX ADMIN — HEPARIN SODIUM 5000 UNITS: 5000 INJECTION INTRAVENOUS; SUBCUTANEOUS at 13:35

## 2024-04-04 RX ADMIN — PIPERACILLIN AND TAZOBACTAM 4.5 G: 36; 4.5 INJECTION, POWDER, FOR SOLUTION INTRAVENOUS at 21:31

## 2024-04-04 RX ADMIN — SODIUM CHLORIDE, SODIUM LACTATE, POTASSIUM CHLORIDE, AND CALCIUM CHLORIDE 125 ML/HR: .6; .31; .03; .02 INJECTION, SOLUTION INTRAVENOUS at 14:25

## 2024-04-04 RX ADMIN — HEPARIN SODIUM 5000 UNITS: 5000 INJECTION INTRAVENOUS; SUBCUTANEOUS at 21:31

## 2024-04-04 RX ADMIN — ACETAMINOPHEN 975 MG: 325 TABLET, FILM COATED ORAL at 17:06

## 2024-04-04 RX ADMIN — ACETAMINOPHEN 975 MG: 325 TABLET, FILM COATED ORAL at 08:53

## 2024-04-04 RX ADMIN — PIPERACILLIN AND TAZOBACTAM 4.5 G: 36; 4.5 INJECTION, POWDER, FOR SOLUTION INTRAVENOUS at 13:28

## 2024-04-04 RX ADMIN — ALPRAZOLAM 0.25 MG: 0.25 TABLET ORAL at 17:06

## 2024-04-04 RX ADMIN — PANTOPRAZOLE SODIUM 40 MG: 40 INJECTION, POWDER, FOR SOLUTION INTRAVENOUS at 08:53

## 2024-04-04 RX ADMIN — SODIUM CHLORIDE, SODIUM LACTATE, POTASSIUM CHLORIDE, AND CALCIUM CHLORIDE 125 ML/HR: .6; .31; .03; .02 INJECTION, SOLUTION INTRAVENOUS at 01:21

## 2024-04-04 RX ADMIN — SODIUM CHLORIDE 1000 ML: 0.9 INJECTION, SOLUTION INTRAVENOUS at 00:44

## 2024-04-04 NOTE — ED PROVIDER NOTES
History  Chief Complaint   Patient presents with    Abdominal Pain     LLQ pain starting yesterday increasing in severity today. Patient reports 2 episodes of vomiting today, denies any diarrhea but reports two soft bowel movements today.      Patient is a 38-year-old male who presents to the emergency room for x2 days of abdominal pain.  Patient reports abdominal pain to his left lower quadrant that waxes and wanes, and never completely resolves.  Patient reports that he was seen at urgent care around 2:30 PM today and advised him to go to the ED if symptoms worsen.  Symptoms worsened tonight with associated diaphoresis and nausea.  On initial evaluation, nausea has resolved. Associated 2 soft bowel movements with no blood.  Denies any other symptoms at this time. Tried Tylenol and Tums around 8:30 PM.           Prior to Admission Medications   Prescriptions Last Dose Informant Patient Reported? Taking?   ALPRAZolam (XANAX) 0.25 mg tablet   No No   Sig: Take 1 tablet (0.25 mg total) by mouth daily as needed for anxiety for up to 90 days      Facility-Administered Medications: None       Past Medical History:   Diagnosis Date    Anxiety        History reviewed. No pertinent surgical history.    Family History   Problem Relation Age of Onset    Hypertension Father      I have reviewed and agree with the history as documented.    E-Cigarette/Vaping    E-Cigarette Use Never User      E-Cigarette/Vaping Substances    Nicotine No     THC No     CBD Yes     Flavoring No     Other No     Unknown No      Social History     Tobacco Use    Smoking status: Former     Current packs/day: 0.00     Average packs/day: 0.3 packs/day for 8.0 years (2.0 ttl pk-yrs)     Types: Cigarettes     Quit date: 9/3/2022     Years since quittin.5    Smokeless tobacco: Never   Vaping Use    Vaping status: Never Used   Substance Use Topics    Alcohol use: Yes     Comment: rarely    Drug use: Not Currently       Review of Systems    Constitutional:  Positive for diaphoresis. Negative for chills and fever.   HENT:  Negative for ear pain, sore throat, trouble swallowing and voice change.    Eyes:  Negative for pain and visual disturbance.   Respiratory:  Negative for cough and shortness of breath.    Cardiovascular:  Negative for chest pain and palpitations.   Gastrointestinal:  Positive for abdominal pain and nausea. Negative for blood in stool, constipation, diarrhea and vomiting.   Genitourinary:  Negative for dysuria, flank pain and hematuria.   Musculoskeletal:  Negative for arthralgias and back pain.   Skin:  Negative for color change and rash.   Neurological:  Negative for seizures, syncope and headaches.   Psychiatric/Behavioral:  Negative for confusion.    All other systems reviewed and are negative.      Physical Exam  Physical Exam  Vitals and nursing note reviewed.   Constitutional:       General: He is not in acute distress.     Appearance: He is well-developed.   HENT:      Head: Normocephalic and atraumatic.   Eyes:      Conjunctiva/sclera: Conjunctivae normal.   Cardiovascular:      Rate and Rhythm: Regular rhythm. Tachycardia present.      Heart sounds: No murmur heard.  Pulmonary:      Effort: Pulmonary effort is normal. No respiratory distress.      Breath sounds: Normal breath sounds.   Abdominal:      General: Abdomen is flat.      Palpations: Abdomen is soft.      Tenderness: There is abdominal tenderness in the left upper quadrant and left lower quadrant. There is no right CVA tenderness or left CVA tenderness.   Musculoskeletal:         General: No swelling.      Cervical back: Neck supple.   Skin:     General: Skin is warm and dry.      Capillary Refill: Capillary refill takes less than 2 seconds.   Neurological:      Mental Status: He is alert.   Psychiatric:         Mood and Affect: Mood normal.         Vital Signs  ED Triage Vitals   Temperature Pulse Respirations Blood Pressure SpO2   04/03/24 2147 04/03/24 2147  04/03/24 2147 04/03/24 2147 04/03/24 2147   97.5 °F (36.4 °C) (!) 130 19 147/82 98 %      Temp Source Heart Rate Source Patient Position - Orthostatic VS BP Location FiO2 (%)   04/03/24 2147 04/03/24 2147 04/03/24 2147 04/03/24 2147 --   Oral Monitor Sitting Left arm       Pain Score       04/03/24 2300       4           Vitals:    04/04/24 0000 04/04/24 0015 04/04/24 0115 04/04/24 0230   BP: 141/73 133/99 150/80    Pulse: 105 (!) 107 (!) 112 96   Patient Position - Orthostatic VS: Sitting Sitting Sitting          Visual Acuity      ED Medications  Medications   lactated ringers infusion (125 mL/hr Intravenous New Bag 4/4/24 0121)   ondansetron (ZOFRAN) injection 4 mg (has no administration in time range)   heparin (porcine) subcutaneous injection 5,000 Units (5,000 Units Subcutaneous Not Given 4/4/24 0114)   morphine injection 2 mg (has no administration in time range)   pantoprazole (PROTONIX) injection 40 mg (has no administration in time range)   piperacillin-tazobactam (ZOSYN) IVPB 4.5 g (0 g Intravenous Hold 4/4/24 0113)     Followed by   piperacillin-tazobactam (ZOSYN) IVPB (EXTENDED INFUSION) 4.5 g (has no administration in time range)   ALPRAZolam (XANAX) tablet 0.25 mg (has no administration in time range)   acetaminophen (TYLENOL) tablet 975 mg (has no administration in time range)   sodium chloride 0.9 % bolus 1,000 mL (0 mL Intravenous Stopped 4/4/24 0056)   acetaminophen (Ofirmev) injection 1,000 mg (0 mg Intravenous Stopped 4/3/24 2257)   sodium chloride 0.9 % bolus 1,000 mL (0 mL Intravenous Stopped 4/3/24 2351)   piperacillin-tazobactam (ZOSYN) IVPB 4.5 g (0 g Intravenous Stopped 4/4/24 0028)   iohexol (OMNIPAQUE) 350 MG/ML injection (MULTI-DOSE) 100 mL (100 mL Intravenous Given 4/3/24 2255)   sodium chloride 0.9 % bolus 1,000 mL (0 mL Intravenous Stopped 4/4/24 0152)       Diagnostic Studies  Results Reviewed       Procedure Component Value Units Date/Time    Platelet count [[id number]]  (Normal)  Collected: 04/04/24 0237    Lab Status: Final result Specimen: Blood from Arm, Right Updated: 04/04/24 0245     Platelets 184 Thousands/uL      MPV 10.0 fL     HS Troponin I 4hr [[id number]] Collected: 04/04/24 0237    Lab Status: In process Specimen: Blood from Arm, Right Updated: 04/04/24 0242    HS Troponin I 2hr [[id number]]  (Normal) Collected: 04/04/24 0043    Lab Status: Final result Specimen: Blood from Arm, Right Updated: 04/04/24 0111     hs TnI 2hr 4 ng/L      Delta 2hr hsTnI -1 ng/L     Urine Microscopic [[id number]]  (Normal) Collected: 04/03/24 2336    Lab Status: Final result Specimen: Urine, Clean Catch Updated: 04/03/24 2346     RBC, UA 1-2 /hpf      WBC, UA None Seen /hpf      Epithelial Cells None Seen /hpf      Bacteria, UA None Seen /hpf     UA w Reflex to Microscopic w Reflex to Culture [[id number]]  (Abnormal) Collected: 04/03/24 2336    Lab Status: Final result Specimen: Urine, Clean Catch Updated: 04/03/24 2346     Color, UA Light Yellow     Clarity, UA Clear     Specific Gravity, UA >=1.050     pH, UA 7.0     Leukocytes, UA Negative     Nitrite, UA Negative     Protein, UA 50 (1+) mg/dl      Glucose, UA Negative mg/dl      Ketones, UA 20 (1+) mg/dl      Urobilinogen, UA 2.0 mg/dl      Bilirubin, UA Negative     Occult Blood, UA Trace    FLU/RSV/COVID - if FLU/RSV clinically relevant [[id number]]  (Normal) Collected: 04/03/24 2241    Lab Status: Final result Specimen: Nares from Nose Updated: 04/03/24 2327     SARS-CoV-2 Negative     INFLUENZA A PCR Negative     INFLUENZA B PCR Negative     RSV PCR Negative    Narrative:      FOR PEDIATRIC PATIENTS - copy/paste COVID Guidelines URL to browser: [email or web address]    SARS-CoV-2 assay is a Nucleic Acid Amplification assay intended for the  qualitative detection of nucleic acid from SARS-CoV-2 in nasopharyngeal  swabs. Results are for the presumptive identification of SARS-CoV-2 RNA.    Positive  results are indicative of infection with SARS-CoV-2, the virus  causing COVID-19, but do not rule out bacterial infection or co-infection  with other viruses. Laboratories within the [place] and its  territories are required to report all positive results to the appropriate  public health authorities. Negative results do not preclude SARS-CoV-2  infection and should not be used as the sole basis for treatment or other  patient management decisions. Negative results must be combined with  clinical observations, patient history, and epidemiological information.  This test has not been FDA cleared or approved.    This test has been authorized by FDA under an Emergency Use Authorization  (EUA). This test is only authorized for the duration of time the  declaration that circumstances exist justifying the authorization of the  emergency use of an in vitro diagnostic tests for detection of SARS-CoV-2  virus and/or diagnosis of COVID-19 infection under section 564(b)(1) of  the Act, 21 U.S.C. 360bbb-3(b)(1), unless the authorization is terminated  or revoked sooner. The test has been validated but independent review by FDA  and CLIA is pending.    Test performed using [employer] GeneXpert: This RT-PCR assay targets N2,  a region unique to SARS-CoV-2. A conserved region in the E-gene was chosen  for pan-Sarbecovirus detection which includes SARS-CoV-2.    According to CMS-2020-01-R, this platform meets the definition of high-throughput technology.    Procalcitonin [[id number]]  (Abnormal) Collected: 04/03/24 2241    Lab Status: Final result Specimen: Blood from Arm, Right Updated: 04/03/24 2317     Procalcitonin 2.80 ng/ml     HS Troponin 0hr (reflex protocol) [[id number]]  (Normal) Collected: 04/03/24 2241    Lab Status: Final result Specimen: Blood from Arm, Right Updated: 04/03/24 2316     hs TnI 0hr 5 ng/L     B-Type Natriuretic Peptide(BNP) [[id number]]  (Normal) Collected: 04/03/24 2241    Lab Status: Final result  Specimen: Blood from Arm, Right Updated: 04/03/24 2313     BNP 27 pg/mL     Lactic acid [[id number]]  (Normal) Collected: 04/03/24 2241    Lab Status: Final result Specimen: Blood from Arm, Right Updated: 04/03/24 2311     LACTIC ACID 1.8 mmol/L     Narrative:      Result may be elevated if tourniquet was used during collection.    Protime-INR [[id number]]  (Abnormal) Collected: 04/03/24 2241    Lab Status: Final result Specimen: Blood from Arm, Right Updated: 04/03/24 2306     Protime 15.0 seconds      INR 1.11    APTT [[id number]]  (Normal) Collected: 04/03/24 2241    Lab Status: Final result Specimen: Blood from Arm, Right Updated: 04/03/24 2306     PTT 34 seconds     Blood culture #1 [[id number]] Collected: 04/03/24 2251    Lab Status: In process Specimen: Blood from Arm, Left Updated: 04/03/24 2254    Blood culture #2 [[id number]] Collected: 04/03/24 2241    Lab Status: In process Specimen: Blood from Arm, Right Updated: 04/03/24 2247    Lipase [[id number]]  (Abnormal) Collected: 04/03/24 2151    Lab Status: Final result Specimen: Blood from Arm, Right Updated: 04/03/24 2216     Lipase <6 u/L     Comprehensive metabolic panel [[id number]]  (Abnormal) Collected: 04/03/24 2151    Lab Status: Final result Specimen: Blood from Arm, Right Updated: 04/03/24 2216     Sodium 134 mmol/L      Potassium 3.9 mmol/L      Chloride 100 mmol/L      CO2 23 mmol/L      ANION GAP 11 mmol/L      BUN 11 mg/dL      Creatinine 1.24 mg/dL      Glucose 123 mg/dL      Calcium 9.2 mg/dL      AST 10 U/L      ALT 20 U/L      Alkaline Phosphatase 111 U/L      Total Protein 8.1 g/dL      Albumin 4.6 g/dL      Total Bilirubin 1.83 mg/dL      eGFR 74 ml/min/1.73sq m     Narrative:      National Kidney Disease Foundation guidelines for Chronic Kidney Disease (CKD):     Stage 1 with normal or high GFR (GFR > 90 mL/min/1.73 square meters)    Stage 2 Mild CKD (GFR = 60-89 mL/min/1.73 square meters)    Stage 3A Moderate CKD (GFR = 45-59 mL/min/1.73 square  meters)    Stage 3B Moderate CKD (GFR = 30-44 mL/min/1.73 square meters)    Stage 4 Severe CKD (GFR = 15-29 mL/min/1.73 square meters)    Stage 5 End Stage CKD (GFR <15 mL/min/1.73 square meters)  Note: GFR calculation is accurate only with a steady state creatinine    CBC and differential [[id number]]  (Abnormal) Collected: 04/03/24 2151    Lab Status: Final result Specimen: Blood from Arm, Right Updated: 04/03/24 2202     WBC 26.37 Thousand/uL      RBC 5.45 Million/uL      Hemoglobin 16.4 g/dL      Hematocrit 47.8 %      MCV 88 fL      MCH 30.1 pg      MCHC 34.3 g/dL      RDW 12.7 %      MPV 10.2 fL      Platelets 267 Thousands/uL      nRBC 0 /100 WBCs      Neutrophils Relative 82 %      Immature Grans % 1 %      Lymphocytes Relative 8 %      Monocytes Relative 9 %      Eosinophils Relative 0 %      Basophils Relative 0 %      Neutrophils Absolute 21.56 Thousands/µL      Absolute Immature Grans 0.15 Thousand/uL      Absolute Lymphocytes 2.22 Thousands/µL      Absolute Monocytes 2.37 Thousand/µL      Eosinophils Absolute 0.01 Thousand/µL      Basophils Absolute 0.06 Thousands/µL                    XR chest portable   ED Interpretation by Hodan Gay PA-C (04/03 4826)   No acute cardiopulmonary abnormality      CT abdomen pelvis with contrast   Final Result by Gage Mckoy MD (04/03 0571)      1.  Acute perforated diverticulitis of the splenic flexure/proximal descending colon as detailed above. Recommend surgical consultation.   2.  Mild diffuse hepatic steatosis.   3.  Indeterminate 2.6 x 1.9 cm left adrenal intermediate density nodule. Consider nonemergent adrenal washout CT or chemical shift MRI for further characterization.      Findings discussed with JULIAN Gay at 11:54 p.m.      Workstation performed: [id number]                    Procedures  ECG 12 Lead Documentation Only    Date/Time: 4/3/2024 9:50 PM    Performed by: Hodan Gay PA-C  Authorized by: Hodan Gay PA-C    Indications /  Diagnosis:  Sepsis work-up  ECG reviewed by me, the ED Provider: yes    Patient location:  ED  Rate:     ECG rate:  134    ECG rate assessment: tachycardic    Rhythm:     Rhythm: sinus tachycardia    QRS:     QRS axis:  Right  ST segments:     ST segments:  Normal  T waves:     T waves: normal             ED Course  ED Course as of 04/04/24 0250   Wed Apr 03, 2024 2212 WBC(!): 26.37   2217 ALK PHOS(!): 111   2217 Total Bilirubin(!): 1.83   2312 Pulse(!): 120   2318 Procalcitonin(!): 2.80   2349 Blood, UA(!): Trace   2349 Urobilinogen, UA(!): 2.0   2350 Ketones, UA(!): 20 (1+)   2351 Pulse: 104  Improvement after IV fluids.      Thu Apr 04, 2024   0000 CT abdomen pelvis with contrast  IMPRESSION:     1.  Acute perforated diverticulitis of the splenic flexure/proximal descending colon as detailed above. Recommend surgical consultation.  2.  Mild diffuse hepatic steatosis.  3.  Indeterminate 2.6 x 1.9 cm left adrenal intermediate density nodule. Consider nonemergent adrenal washout CT or chemical shift MRI for further characterization.                  HEART Risk Score      Flowsheet Row Most Recent Value   Heart Score Risk Calculator    History 0 Filed at: 04/03/2024 2315   ECG 1 Filed at: 04/03/2024 2315   Age 0 Filed at: 04/03/2024 2315   Risk Factors 1 Filed at: 04/03/2024 2315   Troponin 0 Filed at: 04/03/2024 2315   HEART Score 2 Filed at: 04/03/2024 2315                          SBIRT 22yo+      Flowsheet Row Most Recent Value   Initial Alcohol Screen: US AUDIT-C     1. How often do you have a drink containing alcohol? 1 Filed at: 04/03/2024 2212   2. How many drinks containing alcohol do you have on a typical day you are drinking?  1 Filed at: 04/03/2024 2212   3b. FEMALE Any Age, or MALE 65+: How often do you have 4 or more drinks on one occassion? 0 Filed at: 04/03/2024 2212   Audit-C Score 2 Filed at: 04/03/2024 2212   CLIFFORD: How many times in the past year have you...    Used an illegal drug or used a  "prescription medication for non-medical reasons? Never Filed at: 04/03/2024 2212                      Medical Decision Making  38-year-old male presenting for x2 days LLQ pain. Associated diaphoresis and nausea. On arrival heart rate in the 130s. Other vital signs stable. On exam, patient ill-appearing and very diaphoretic with associated tenderness to his left-sided abdomen. EKG NSR with sinus tachycardia with no acute ischemic changes. WBC 26.37. Increased total bilirubin 1.83, and alk phos 111.  Procalcitonin 2.80.  Trace blood on UA with associated 1+ ketones. Symptom improvement after fluids, IV acetaminophen. Patient started on IV Zosyn. CT shows, \"IMPRESSION:    1. Acute perforated diverticulitis of the splenic flexure/proximal descending colon as detailed above. Recommend surgical consultation.  2. Mild diffuse hepatic steatosis.  3. Indeterminate 2.6 x 1.9 cm left adrenal intermediate density nodule. Consider nonemergent adrenal washout CT or chemical shift MRI for further characterization\".  Discussed with general surgery.  Patient to be admitted to their service and they will see him tomorrow.  Discussed admission plan and ED course with patient.  Patient understands and consents to admission at this time.  All questions answered appropriately.    Amount and/or Complexity of Data Reviewed  Labs: ordered. Decision-making details documented in ED Course.  Radiology: ordered and independent interpretation performed. Decision-making details documented in ED Course.    Risk  Prescription drug management.             Disposition  Final diagnoses:   Abdominal pain   Sepsis (HCC)   Diverticulitis     Time reflects when diagnosis was documented in both MDM as applicable and the Disposition within this note       Time User Action Codes Description Comment    4/3/2024 11:53 PM Hodan Gay Add [R10.9] Abdominal pain     4/3/2024 11:54 PM Hodan Gay Add [A41.9] Sepsis (HCC)     4/3/2024 11:54 PM Hodan Gay " Add [K57.92] Diverticulitis     4/3/2024 11:55 PM Hodan Gay Modify [R10.9] Abdominal pain     4/3/2024 11:55 PM Hodan Gay [A41.9] Sepsis (HCC)           ED Disposition       None          Follow-up Information    None         Patient's Medications   Discharge Prescriptions    No medications on file       No discharge procedures on file.    PDMP Review       None            ED Provider  Electronically Signed by             Hodan Gay PA-C  04/04/24 0250

## 2024-04-04 NOTE — H&P
H&P Exam - General Surgery   Melanie Langford 38 y.o. male MRN: [id number]  Unit/Bed#: ED 16 Encounter: [id number]    Assessment/Plan     Melanie Langford is a 38 y.o. male    Acute diverticulitis at splenic flexure/proximal descending colon with perforation   SIRS meeting sepsis criteria 2/2 acute diverticulitis give   CT of the abdomen and pelvis showed acute perforated diverticulitis of the splenic flexure/proximal descending colon with abnormal inflammatory debris extending inferiorly along the retroperitoneum into the left lower quadrant and left pelvic region. Several foci of free air adjacent to the posterior wall of the proximal descending colon in the region of a enlarged inflamed diverticula, no drainable fluid collection/abscess    No indication for emergent surgical intervention at this time. The patient does  meet sepsis criteria with fever of 101.5 this AM and WBC 19.53, -120 during examination. At this time, he does remain hemodynamically stable and states his abdominal pain is markedly improved at this time. He denies any nausea or emesis, denies any flank or back pain, and states he is passing flatus and did have a bowel movement yesterday evening. On examination, his abdomen is soft, nondistended, nontender to palpation, no guarding or rebound, no peritoneal signs, no flank tenderness or CVA tenderness. No skin changes along abdomen or sides/back noted.     At this time, will attempt to treat perforated diverticulitis with nonoperative management with bowel rest and IV antibiotics. If patient fails to demonstrate any significant improvement in next 24 hours, surgical intervention vs repeat imaging to assess for drainable collection will be recommended. This was discussed with the patient who expresses understanding   Close monitoring of fever curve and HR at this time given persistent tachycardia, of note, patient HR was documented to be 145 at urgent care yesterday afternoon.  IV Zosyn for  antimicrobial coverage, follow up on blood cultures  Trend labs, monitor Hb, WBC, and vitals   I/Os  Serial abdominal exams  Encourage ambulation/OOB  Pain control/Antiemetics PRN  IS 10x an hour  DVT prophylaxis       History of Present Illness     HPI:  Melanie Langford is a 38 y.o. male who presents with a two day history of abdominal pain. The patient states the pain is located on his lower left side and has been waxing and waning over the last several days but states it never fully resolves. He notes that he was evaluated at an urgent care and he was recommended to go to the ED given findings of LLQ pain and tachycardia. The patient states that at the time, he was feeling overall fairly well and did not go, but states he developed worsening pain and bloating overnight which prompted him to seek medical attention. CT of the abdomen and pelvis showed acute diverticulitis at the sigmoid flexure/descending colon with evidence of perforation. General surgery was subsequently consulted. The patient states he has had two prior episodes of similar but far more mild pain in the last several years but notes the first resolved with a Zpac and the 2nd resolved prior to seeking medical attention. The patient denies any prior pertinent medical or surgical history. The patient states he has never had a colonoscopy.     Review of Systems   Constitutional:  Positive for fever. Negative for activity change, fatigue and unexpected weight change.   HENT:  Negative for congestion, sore throat, tinnitus, trouble swallowing and voice change.    Eyes:  Negative for photophobia, discharge and visual disturbance.   Respiratory:  Negative for apnea, cough, chest tightness, shortness of breath and wheezing.    Cardiovascular:  Negative for chest pain, palpitations and leg swelling.   Gastrointestinal:  Positive for abdominal pain and nausea. Negative for abdominal distention, diarrhea and vomiting.   Endocrine: Negative for cold intolerance,  polyphagia and polyuria.   Genitourinary:  Negative for decreased urine volume, difficulty urinating, flank pain, frequency, hematuria and urgency.   Musculoskeletal:  Negative for arthralgias, back pain, joint swelling and myalgias.   Skin:  Negative for color change, pallor, rash and wound.   Neurological:  Negative for dizziness, seizures, facial asymmetry, light-headedness and numbness.   Psychiatric/Behavioral:  Negative for agitation and behavioral problems.        Historical Information   Past Medical History:   Diagnosis Date    Anxiety      History reviewed. No pertinent surgical history.  Social History   Social History     Substance and Sexual Activity   Alcohol Use Yes    Comment: rarely     Social History     Substance and Sexual Activity   Drug Use Not Currently     Social History     Tobacco Use   Smoking Status Former    Current packs/day: 0.00    Average packs/day: 0.3 packs/day for 8.0 years (2.0 ttl pk-yrs)    Types: Cigarettes    Quit date: 9/3/2022    Years since quittin.5   Smokeless Tobacco Never     Family History:   Family History   Problem Relation Age of Onset    Hypertension Father        Meds/Allergies   PTA meds:   Prior to Admission Medications   Prescriptions Last Dose Informant Patient Reported? Taking?   ALPRAZolam (XANAX) 0.25 mg tablet   No No   Sig: Take 1 tablet (0.25 mg total) by mouth daily as needed for anxiety for up to 90 days      Facility-Administered Medications: None     No Known Allergies    Objective   First Vitals:   Blood Pressure: 147/82 (24)  Pulse: (!) 130 (24)  Temperature: 97.5 °F (36.4 °C) (24)  Temp Source: Oral (24)  Respirations: 19 (24)  Height: 6' (182.9 cm) (24)  Weight - Scale: 106 kg (234 lb 2.1 oz) (24)  SpO2: 98 % (24)    Current Vitals:   Blood Pressure: 138/63 (24)  Pulse: (!) 120 (24)  Temperature: (!) 101.5 °F (38.6 °C) (24  0835)  Temp Source: Oral (04/04/24 0835)  Respirations: 16 (04/04/24 0845)  Height: 6' (182.9 cm) (04/03/24 2249)  Weight - Scale: 106 kg (234 lb 2.1 oz) (04/03/24 2249)  SpO2: 95 % (04/04/24 0845)      Intake/Output Summary (Last 24 hours) at 4/4/2024 0906  Last data filed at 4/4/2024 0835  Gross per 24 hour   Intake 100 ml   Output 1600 ml   Net -1500 ml       Invasive Devices       Peripheral Intravenous Line  Duration             Peripheral IV 04/03/24 Proximal;Right;Ventral (anterior) Forearm <1 day    Peripheral IV 04/04/24 Distal;Dorsal (posterior);Left Forearm <1 day                    Physical Exam  Constitutional:       General: He is not in acute distress.     Appearance: He is well-developed. He is not diaphoretic.   HENT:      Head: Normocephalic and atraumatic.      Right Ear: External ear normal.      Left Ear: External ear normal.      Mouth/Throat:      Pharynx: No oropharyngeal exudate.   Eyes:      Conjunctiva/sclera: Conjunctivae normal.      Pupils: Pupils are equal, round, and reactive to light.   Neck:      Thyroid: No thyromegaly.      Trachea: No tracheal deviation.   Cardiovascular:      Rate and Rhythm: Normal rate and regular rhythm.      Heart sounds: Normal heart sounds. No murmur heard.     No friction rub. No gallop.   Pulmonary:      Effort: Pulmonary effort is normal. No respiratory distress.      Breath sounds: Normal breath sounds. No wheezing or rales.   Chest:      Chest wall: No tenderness.   Abdominal:      General: Bowel sounds are normal. There is no distension.      Palpations: Abdomen is soft.      Tenderness: There is no abdominal tenderness. There is no guarding or rebound.      Comments: Abdomen is soft, nondistended, normoactive bowel sounds, nontender to light or deep palpation, no skin changes along abdomen, sides, or back, no CVA tenderness, no flank tenderness    Musculoskeletal:         General: No tenderness or deformity. Normal range of motion.      Cervical  back: Normal range of motion and neck supple.   Skin:     General: Skin is warm and dry.      Coloration: Skin is not pale.      Findings: No erythema or rash.   Neurological:      Mental Status: He is alert and oriented to person, place, and time.   Psychiatric:         Behavior: Behavior normal.         Thought Content: Thought content normal.         Lab Results: I have personally reviewed pertinent lab results.    Recent Results (from the past 36 hour(s))   ECG 12 lead    Collection Time: 04/03/24  9:50 PM   Result Value Ref Range    Ventricular Rate 134 BPM    Atrial Rate 134 BPM    NH Interval 128 ms    QRSD Interval 84 ms    QT Interval 300 ms    QTC Interval 448 ms    P [place] 57 degrees    QRS Axis 96 degrees    T Wave Axis -17 degrees   CBC and differential    Collection Time: 04/03/24  9:51 PM   Result Value Ref Range    WBC 26.37 (H) 4.31 - 10.16 Thousand/uL    RBC 5.45 3.88 - 5.62 Million/uL    Hemoglobin 16.4 12.0 - 17.0 g/dL    Hematocrit 47.8 36.5 - 49.3 %    MCV 88 82 - 98 fL    MCH 30.1 26.8 - 34.3 pg    MCHC 34.3 31.4 - 37.4 g/dL    RDW 12.7 11.6 - 15.1 %    MPV 10.2 8.9 - 12.7 fL    Platelets 267 149 - 390 Thousands/uL    nRBC 0 /100 WBCs    Neutrophils Relative 82 (H) 43 - 75 %    Immature Grans % 1 0 - 2 %    Lymphocytes Relative 8 (L) 14 - 44 %    Monocytes Relative 9 4 - 12 %    Eosinophils Relative 0 0 - 6 %    Basophils Relative 0 0 - 1 %    Neutrophils Absolute 21.56 (H) 1.85 - 7.62 Thousands/µL    Absolute Immature Grans 0.15 0.00 - 0.20 Thousand/uL    Absolute Lymphocytes 2.22 0.60 - 4.47 Thousands/µL    Absolute Monocytes 2.37 (H) 0.17 - 1.22 Thousand/µL    Eosinophils Absolute 0.01 0.00 - 0.61 Thousand/µL    Basophils Absolute 0.06 0.00 - 0.10 Thousands/µL   Comprehensive metabolic panel    Collection Time: 04/03/24  9:51 PM   Result Value Ref Range    Sodium 134 (L) 135 - 147 mmol/L    Potassium 3.9 3.5 - 5.3 mmol/L    Chloride 100 96 - 108 mmol/L    CO2 23 21 - 32 mmol/L    ANION GAP  "11 4 - 13 mmol/L    BUN 11 5 - 25 mg/dL    Creatinine 1.24 0.60 - 1.30 mg/dL    Glucose 123 65 - 140 mg/dL    Calcium 9.2 8.4 - 10.2 mg/dL    AST 10 (L) 13 - 39 U/L    ALT 20 7 - 52 U/L    Alkaline Phosphatase 111 (H) 34 - 104 U/L    Total Protein 8.1 6.4 - 8.4 g/dL    Albumin 4.6 3.5 - 5.0 g/dL    Total Bilirubin 1.83 (H) 0.20 - 1.00 mg/dL    eGFR 74 ml/min/1.73sq m   Lipase    Collection Time: 04/03/24  9:51 PM   Result Value Ref Range    Lipase <6 (L) 11 - 82 u/L   Lactic acid    Collection Time: 04/03/24 10:41 PM   Result Value Ref Range    LACTIC ACID 1.8 0.5 - 2.0 mmol/L   Procalcitonin    Collection Time: 04/03/24 10:41 PM   Result Value Ref Range    Procalcitonin 2.80 (H) <=0.25 ng/ml   Protime-INR    Collection Time: 04/03/24 10:41 PM   Result Value Ref Range    Protime 15.0 (H) 11.6 - 14.5 seconds    INR 1.11 0.84 - 1.19   APTT    Collection Time: 04/03/24 10:41 PM   Result Value Ref Range    PTT 34 23 - 37 seconds   B-Type Natriuretic Peptide(BNP)    Collection Time: 04/03/24 10:41 PM   Result Value Ref Range    BNP 27 0 - 100 pg/mL   FLU/RSV/COVID - if FLU/RSV clinically relevant    Collection Time: 04/03/24 10:41 PM    Specimen: Nose; Nares   Result Value Ref Range    SARS-CoV-2 Negative Negative    INFLUENZA A PCR Negative Negative    INFLUENZA B PCR Negative Negative    RSV PCR Negative Negative   HS Troponin 0hr (reflex protocol)    Collection Time: 04/03/24 10:41 PM   Result Value Ref Range    hs TnI 0hr 5 \"Refer to ACS Flowchart\"- see link ng/L   UA w Reflex to Microscopic w Reflex to Culture    Collection Time: 04/03/24 11:36 PM    Specimen: Urine, Clean Catch   Result Value Ref Range    Color, UA Light Yellow     Clarity, UA Clear     Specific Gravity, UA >=1.050 (H) 1.003 - 1.030    pH, UA 7.0 4.5, 5.0, 5.5, 6.0, 6.5, 7.0, 7.5, 8.0    Leukocytes, UA Negative Negative    Nitrite, UA Negative Negative    Protein, UA 50 (1+) (A) Negative mg/dl    Glucose, UA Negative Negative mg/dl    Ketones, UA " "20 (1+) (A) Negative mg/dl    Urobilinogen, UA 2.0 (A) <2.0 mg/dl mg/dl    Bilirubin, UA Negative Negative    Occult Blood, UA Trace (A) Negative   Urine Microscopic    Collection Time: 04/03/24 11:36 PM   Result Value Ref Range    RBC, UA 1-2 None Seen, 1-2 /hpf    WBC, UA None Seen None Seen, 1-2 /hpf    Epithelial Cells None Seen None Seen, Occasional /hpf    Bacteria, UA None Seen None Seen, Occasional /hpf   HS Troponin I 2hr    Collection Time: 04/04/24 12:43 AM   Result Value Ref Range    hs TnI 2hr 4 \"Refer to ACS Flowchart\"- see link ng/L    Delta 2hr hsTnI -1 <20 ng/L   HS Troponin I 4hr    Collection Time: 04/04/24  2:37 AM   Result Value Ref Range    hs TnI 4hr 4 \"Refer to ACS Flowchart\"- see link ng/L    Delta 4hr hsTnI -1 <20 ng/L   Platelet count    Collection Time: 04/04/24  2:37 AM   Result Value Ref Range    Platelets 184 149 - 390 Thousands/uL    MPV 10.0 8.9 - 12.7 fL   Type and screen    Collection Time: 04/04/24  4:29 AM   Result Value Ref Range    ABO Grouping O     Rh Factor Positive     Antibody Screen Negative     Specimen Expiration Date [id number]    ABORh Recheck - Contact Blood Bank Prior to Collection    Collection Time: 04/04/24  4:29 AM   Result Value Ref Range    ABO Grouping O     Rh Factor Positive    Comprehensive metabolic panel    Collection Time: 04/04/24  4:29 AM   Result Value Ref Range    Sodium 137 135 - 147 mmol/L    Potassium 3.8 3.5 - 5.3 mmol/L    Chloride 106 96 - 108 mmol/L    CO2 22 21 - 32 mmol/L    ANION GAP 9 4 - 13 mmol/L    BUN 9 5 - 25 mg/dL    Creatinine 0.96 0.60 - 1.30 mg/dL    Glucose 122 65 - 140 mg/dL    Calcium 8.4 8.4 - 10.2 mg/dL    AST 11 (L) 13 - 39 U/L    ALT 17 7 - 52 U/L    Alkaline Phosphatase 90 34 - 104 U/L    Total Protein 6.8 6.4 - 8.4 g/dL    Albumin 3.7 3.5 - 5.0 g/dL    Total Bilirubin 1.49 (H) 0.20 - 1.00 mg/dL    eGFR 101 ml/min/1.73sq m   Magnesium    Collection Time: 04/04/24  4:29 AM   Result Value Ref Range    Magnesium 1.6 (L) 1.9 - " 2.7 mg/dL   Phosphorus    Collection Time: 04/04/24  4:29 AM   Result Value Ref Range    Phosphorus 1.6 (L) 2.7 - 4.5 mg/dL   CBC and differential    Collection Time: 04/04/24  4:29 AM   Result Value Ref Range    WBC 19.53 (H) 4.31 - 10.16 Thousand/uL    RBC 4.82 3.88 - 5.62 Million/uL    Hemoglobin 14.4 12.0 - 17.0 g/dL    Hematocrit 41.9 36.5 - 49.3 %    MCV 87 82 - 98 fL    MCH 29.9 26.8 - 34.3 pg    MCHC 34.4 31.4 - 37.4 g/dL    RDW 12.7 11.6 - 15.1 %    MPV 9.9 8.9 - 12.7 fL    Platelets 199 149 - 390 Thousands/uL    nRBC 0 /100 WBCs    Neutrophils Relative 84 (H) 43 - 75 %    Immature Grans % 1 0 - 2 %    Lymphocytes Relative 7 (L) 14 - 44 %    Monocytes Relative 8 4 - 12 %    Eosinophils Relative 0 0 - 6 %    Basophils Relative 0 0 - 1 %    Neutrophils Absolute 16.49 (H) 1.85 - 7.62 Thousands/µL    Absolute Immature Grans 0.12 0.00 - 0.20 Thousand/uL    Absolute Lymphocytes 1.40 0.60 - 4.47 Thousands/µL    Absolute Monocytes 1.47 (H) 0.17 - 1.22 Thousand/µL    Eosinophils Absolute 0.01 0.00 - 0.61 Thousand/µL    Basophils Absolute 0.04 0.00 - 0.10 Thousands/µL     Imaging: I have personally reviewed pertinent reports.    XR chest portable    Result Date: 4/4/2024  Impression: No acute cardiopulmonary disease. Workstation performed: [id number]     CT abdomen pelvis with contrast    Result Date: 4/3/2024  Impression: 1.  Acute perforated diverticulitis of the splenic flexure/proximal descending colon as detailed above. Recommend surgical consultation. 2.  Mild diffuse hepatic steatosis. 3.  Indeterminate 2.6 x 1.9 cm left adrenal intermediate density nodule. Consider nonemergent adrenal washout CT or chemical shift MRI for further characterization. Findings discussed with JULIAN Gay at 11:54 p.m. Workstation performed: [id number]        EKG, Pathology, and Other Studies: I have personally reviewed pertinent reports.

## 2024-04-04 NOTE — ED NOTES
Pt ambulated independently to the bathroom, pain is tolerable at this time.     Yoselin Gu RN  04/04/24 0005

## 2024-04-04 NOTE — PLAN OF CARE
Problem: PAIN - ADULT  Goal: Verbalizes/displays adequate comfort level or baseline comfort level  Description: Interventions:  - Encourage patient to monitor pain and request assistance  - Assess pain using appropriate pain scale  - Administer analgesics based on type and severity of pain and evaluate response  - Implement non-pharmacological measures as appropriate and evaluate response  - Consider cultural and social influences on pain and pain management  - Notify physician/advanced practitioner if interventions unsuccessful or patient reports new pain  Outcome: Progressing     Problem: INFECTION - ADULT  Goal: Absence or prevention of progression during hospitalization  Description: INTERVENTIONS:  - Assess and monitor for signs and symptoms of infection  - Monitor lab/diagnostic results  - Monitor all insertion sites, i.e. indwelling lines, tubes, and drains  - Monitor endotracheal if appropriate and nasal secretions for changes in amount and color  - Darby appropriate cooling/warming therapies per order  - Administer medications as ordered  - Instruct and encourage patient and family to use good hand hygiene technique  - Identify and instruct in appropriate isolation precautions for identified infection/condition  Outcome: Progressing  Goal: Absence of fever/infection during neutropenic period  Description: INTERVENTIONS:  - Monitor WBC    Outcome: Progressing     Problem: SAFETY ADULT  Goal: Patient will remain free of falls  Description: INTERVENTIONS:  - Educate patient/family on patient safety including physical limitations  - Instruct patient to call for assistance with activity   - Consult OT/PT to assist with strengthening/mobility   - Keep Call bell within reach  - Keep bed low and locked with side rails adjusted as appropriate  - Keep care items and personal belongings within reach  - Initiate and maintain comfort rounds  - Make Fall Risk Sign visible to staff  - Offer Toileting every 2 Hours,  in advance of need  - Initiate/Maintain bed alarm  - Obtain necessary fall risk management equipment:   - Apply yellow socks and bracelet for high fall risk patients  - Consider moving patient to room near nurses station  Outcome: Progressing  Goal: Maintain or return to baseline ADL function  Description: INTERVENTIONS:  -  Assess patient's ability to carry out ADLs; assess patient's baseline for ADL function and identify physical deficits which impact ability to perform ADLs (bathing, care of mouth/teeth, toileting, grooming, dressing, etc.)  - Assess/evaluate cause of self-care deficits   - Assess range of motion  - Assess patient's mobility; develop plan if impaired  - Assess patient's need for assistive devices and provide as appropriate  - Encourage maximum independence but intervene and supervise when necessary  - Involve family in performance of ADLs  - Assess for home care needs following discharge   - Consider OT consult to assist with ADL evaluation and planning for discharge  - Provide patient education as appropriate  Outcome: Progressing  Goal: Maintains/Returns to pre admission functional level  Description: INTERVENTIONS:  - Perform AM-PAC 6 Click Basic Mobility/ Daily Activity assessment daily.  - Set and communicate daily mobility goal to care team and patient/family/caregiver.   - Collaborate with rehabilitation services on mobility goals if consulted  - Perform Range of Motion 2 times a day.  - Reposition patient every 2 hours.  - Dangle patient 2 times a day  - Stand patient 2 times a day  - Ambulate patient 2 times a day  - Out of bed to chair 2 times a day   - Out of bed for meals 2 times a day  - Out of bed for toileting  - Record patient progress and toleration of activity level   Outcome: Progressing     Problem: DISCHARGE PLANNING  Goal: Discharge to home or other facility with appropriate resources  Description: INTERVENTIONS:  - Identify barriers to discharge w/patient and caregiver  -  Arrange for needed discharge resources and transportation as appropriate  - Identify discharge learning needs (meds, wound care, etc.)  - Arrange for interpretive services to assist at discharge as needed  - Refer to Case Management Department for coordinating discharge planning if the patient needs post-hospital services based on physician/advanced practitioner order or complex needs related to functional status, cognitive ability, or social support system  Outcome: Progressing     Problem: Knowledge Deficit  Goal: Patient/family/caregiver demonstrates understanding of disease process, treatment plan, medications, and discharge instructions  Description: Complete learning assessment and assess knowledge base.  Interventions:  - Provide teaching at level of understanding  - Provide teaching via preferred learning methods  Outcome: Progressing

## 2024-04-04 NOTE — ED RE-EVALUATION NOTE
Septic Shock Reassessment - Melanie Langford 38 y.o. male MRN: [id number]    Unit/Bed#: ED 16 Encounter: [id number]    Sepsis Risk Assessment Report:      Required Reassessment Documentation:    A focused volume status and tissue perfusion exam post fluid resuscitation  Must be documented by physician/APN/PA Including ALL of the following:    Vitals:   Vitals:    04/03/24 2249 04/03/24 2300 04/03/24 2330 04/04/24 0000   BP:  146/92 141/78 141/73   BP Location:  Left arm Left arm Right arm   Pulse:  (!) 120 104 105   Resp:  22 22 20   Temp:  98 °F (36.7 °C) 98 °F (36.7 °C)    TempSrc:  Oral Oral    SpO2:  95% 96% 96%   Weight: 106 kg (234 lb 2.1 oz)      Height: 6' (1.829 m)        Temp  Min: 96 °F (35.6 °C)  Max: 98 °F (36.7 °C)  IBW (Ideal Body Weight): 77.6 kg    Cardiopulmonary Exam (Assess status of heart and lungs):    Cardio:  Irregular rate, normal rhythm     Pulmonary: clear to auscultation bilaterally    Capillary Refill Evaluation: brisk    Peripheral Pulse Evaluation: radial  2+, dorsalis pedis  2+, and posterior tibialis  2+    Skin: Abnormal findings: Diaphoretic     Skin Breakdown and Location: no    On reevaluation, patient reports symptom improvement. Still tachycardic and diaphoretic on physical exam. Improved abdominal tenderness to palpation.  Patient reports only mild tenderness to his left lower quadrant. Currently finishing 1st bag of IV Zosyn. 3rd bag of IV fluids ordered.  Will continue to monitor.             Hodan Gay PA-C  04/04/24 0016

## 2024-04-04 NOTE — UTILIZATION REVIEW
NOTIFICATION OF INPATIENT ADMISSION   AUTHORIZATION REQUEST   SERVICING FACILITY:   [place] [address]  Tax ID: [id number]  NPI: [id number] ATTENDING PROVIDER:  Attending Name and NPI#: Carlos Sutton Md [[id number]]  Address: [address] [place] [address]  Phone: [phone]     ADMISSION INFORMATION:  Place of Service: Inpatient [facility]  Place of Service Code: 21  Inpatient Admission Date/Time: 4/4/24 12:57 AM  Discharge Date/Time: No discharge date for patient encounter.  Admitting Diagnosis Code/Description:  Abdominal pain [R10.9]     UTILIZATION REVIEW CONTACT:  Frieda Ellis Utilization   Network Utilization Review Department  Phone: [phone]  Fax [phone]  Email: [email or web address]  Contact for approvals/pending authorizations, clinical reviews, and discharge.     PHYSICIAN ADVISORY SERVICES:  Medical Necessity Denial & Zhyd-tq-Ddxq Review  Phone: [phone]  Fax: [phone]  Email: [email or web address]     DISCHARGE SUPPORT TEAM:  For Patients Discharge Needs & Updates  Phone: [phone] opt. 2 Fax: [phone]  Email: [email or web address]

## 2024-04-04 NOTE — ED NOTES
"Pt reports having \"large BM, no blood and came out smooth.\" Pt states he feels \"better\"     Soledad Atwood RN  04/04/24 1139    "

## 2024-04-05 LAB
ALBUMIN SERPL BCP-MCNC: 3.4 G/DL (ref 3.5–5)
ALP SERPL-CCNC: 74 U/L (ref 34–104)
ALT SERPL W P-5'-P-CCNC: 13 U/L (ref 7–52)
ANION GAP SERPL CALCULATED.3IONS-SCNC: 9 MMOL/L (ref 4–13)
AST SERPL W P-5'-P-CCNC: 9 U/L (ref 13–39)
BASOPHILS # BLD AUTO: 0.05 THOUSANDS/ÂΜL (ref 0–0.1)
BASOPHILS NFR BLD AUTO: 0 % (ref 0–1)
BILIRUB SERPL-MCNC: 0.83 MG/DL (ref 0.2–1)
BUN SERPL-MCNC: 11 MG/DL (ref 5–25)
CALCIUM ALBUM COR SERPL-MCNC: 8.9 MG/DL (ref 8.3–10.1)
CALCIUM SERPL-MCNC: 8.4 MG/DL (ref 8.4–10.2)
CHLORIDE SERPL-SCNC: 106 MMOL/L (ref 96–108)
CO2 SERPL-SCNC: 21 MMOL/L (ref 21–32)
CREAT SERPL-MCNC: 0.88 MG/DL (ref 0.6–1.3)
EOSINOPHIL # BLD AUTO: 0.07 THOUSAND/ÂΜL (ref 0–0.61)
EOSINOPHIL NFR BLD AUTO: 1 % (ref 0–6)
ERYTHROCYTE [DISTWIDTH] IN BLOOD BY AUTOMATED COUNT: 12.9 % (ref 11.6–15.1)
GFR SERPL CREATININE-BSD FRML MDRD: 110 ML/MIN/1.73SQ M
GLUCOSE SERPL-MCNC: 92 MG/DL (ref 65–140)
HCT VFR BLD AUTO: 35.6 % (ref 36.5–49.3)
HGB BLD-MCNC: 12.3 G/DL (ref 12–17)
IMM GRANULOCYTES # BLD AUTO: 0.06 THOUSAND/UL (ref 0–0.2)
IMM GRANULOCYTES NFR BLD AUTO: 0 % (ref 0–2)
LYMPHOCYTES # BLD AUTO: 1.74 THOUSANDS/ÂΜL (ref 0.6–4.47)
LYMPHOCYTES NFR BLD AUTO: 12 % (ref 14–44)
MCH RBC QN AUTO: 30 PG (ref 26.8–34.3)
MCHC RBC AUTO-ENTMCNC: 34.6 G/DL (ref 31.4–37.4)
MCV RBC AUTO: 87 FL (ref 82–98)
MONOCYTES # BLD AUTO: 0.97 THOUSAND/ÂΜL (ref 0.17–1.22)
MONOCYTES NFR BLD AUTO: 7 % (ref 4–12)
NEUTROPHILS # BLD AUTO: 11.37 THOUSANDS/ÂΜL (ref 1.85–7.62)
NEUTS SEG NFR BLD AUTO: 80 % (ref 43–75)
NRBC BLD AUTO-RTO: 0 /100 WBCS
PLATELET # BLD AUTO: 197 THOUSANDS/UL (ref 149–390)
PMV BLD AUTO: 10.3 FL (ref 8.9–12.7)
POTASSIUM SERPL-SCNC: 3.7 MMOL/L (ref 3.5–5.3)
PROT SERPL-MCNC: 6.4 G/DL (ref 6.4–8.4)
RBC # BLD AUTO: 4.1 MILLION/UL (ref 3.88–5.62)
SODIUM SERPL-SCNC: 136 MMOL/L (ref 135–147)
WBC # BLD AUTO: 14.26 THOUSAND/UL (ref 4.31–10.16)

## 2024-04-05 PROCEDURE — 85025 COMPLETE CBC W/AUTO DIFF WBC: CPT | Performed by: PHYSICIAN ASSISTANT

## 2024-04-05 PROCEDURE — C9113 INJ PANTOPRAZOLE SODIUM, VIA: HCPCS | Performed by: SURGERY

## 2024-04-05 PROCEDURE — 80053 COMPREHEN METABOLIC PANEL: CPT | Performed by: PHYSICIAN ASSISTANT

## 2024-04-05 RX ORDER — DEXTROSE MONOHYDRATE, SODIUM CHLORIDE, AND POTASSIUM CHLORIDE 50; 1.49; 4.5 G/1000ML; G/1000ML; G/1000ML
75 INJECTION, SOLUTION INTRAVENOUS CONTINUOUS
Status: DISCONTINUED | OUTPATIENT
Start: 2024-04-05 | End: 2024-04-06

## 2024-04-05 RX ADMIN — PIPERACILLIN AND TAZOBACTAM 4.5 G: 36; 4.5 INJECTION, POWDER, FOR SOLUTION INTRAVENOUS at 04:48

## 2024-04-05 RX ADMIN — PIPERACILLIN AND TAZOBACTAM 4.5 G: 36; 4.5 INJECTION, POWDER, FOR SOLUTION INTRAVENOUS at 14:09

## 2024-04-05 RX ADMIN — PIPERACILLIN AND TAZOBACTAM 4.5 G: 36; 4.5 INJECTION, POWDER, FOR SOLUTION INTRAVENOUS at 22:00

## 2024-04-05 RX ADMIN — SODIUM CHLORIDE, SODIUM LACTATE, POTASSIUM CHLORIDE, AND CALCIUM CHLORIDE 125 ML/HR: .6; .31; .03; .02 INJECTION, SOLUTION INTRAVENOUS at 00:38

## 2024-04-05 RX ADMIN — SODIUM CHLORIDE, SODIUM LACTATE, POTASSIUM CHLORIDE, AND CALCIUM CHLORIDE 125 ML/HR: .6; .31; .03; .02 INJECTION, SOLUTION INTRAVENOUS at 10:07

## 2024-04-05 RX ADMIN — HEPARIN SODIUM 5000 UNITS: 5000 INJECTION INTRAVENOUS; SUBCUTANEOUS at 04:48

## 2024-04-05 RX ADMIN — DEXTROSE, SODIUM CHLORIDE, AND POTASSIUM CHLORIDE 135 ML/HR: 5; .45; .15 INJECTION INTRAVENOUS at 17:06

## 2024-04-05 RX ADMIN — PANTOPRAZOLE SODIUM 40 MG: 40 INJECTION, POWDER, FOR SOLUTION INTRAVENOUS at 11:29

## 2024-04-05 RX ADMIN — HEPARIN SODIUM 5000 UNITS: 5000 INJECTION INTRAVENOUS; SUBCUTANEOUS at 14:09

## 2024-04-05 NOTE — UTILIZATION REVIEW
Initial Clinical Review    Admission: Date/Time/Statement:   Admission Orders (From admission, onward)       Ordered        04/04/24 0057  Inpatient Admission  Once                          Orders Placed This Encounter   Procedures    Inpatient Admission     Standing Status:   Standing     Number of Occurrences:   1     Order Specific Question:   Level of Care     Answer:   Med Surg [16]     Order Specific Question:   Estimated length of stay     Answer:   More than 2 Midnights     Order Specific Question:   Certification     Answer:   I certify that inpatient services are medically necessary for this patient for a duration of greater than two midnights. See H&P and MD Progress Notes for additional information about the patient's course of treatment.     ED Arrival Information       Expected   -    Arrival   4/3/2024 21:42    Acuity   Urgent              Means of arrival   Walk-In    Escorted by   Self    Service   Surgery-General    Admission type   Emergency              Arrival complaint   ab pain             Chief Complaint   Patient presents with    Abdominal Pain     LLQ pain starting yesterday increasing in severity today. Patient reports 2 episodes of vomiting today, denies any diarrhea but reports two soft bowel movements today.        Initial Presentation: 38 y.o. male to ED from home w/ 2 days abd pain . located on his lower left side and has been waxing and waning over the last several days but states it never fully resolves. On arrival tachycardia . CT of the abdomen and pelvis showed acute diverticulitis at the sigmoid flexure/descending colon with evidence of perforation. Admitted IP status w/ SIRS sec to acute diverticulitis w/ perforation . Plan for nonoperative management with bowel rest and IV antibiotics. If patient fails to demonstrate any significant improvement in next 24 hours, surgical intervention vs repeat imaging to assess for drainable collection , IV zosyn , trend labs , I&O , serial  exams , amb , pain and nausea control , IS , DVT ppx .     Date:  4/5  Day 2: Acute diverticulitis at splenic flexure/proximal descending colon with perforation  . + tachycardia , leukocytosis , fever and tachypnea . Plan NPO , IVF , IV abx . Serial exams , monitor vitals and labs .. If cont to improve will start clear liq diet in am . Allow bowel rest x24 hrs .     ED Triage Vitals   Temperature Pulse Respirations Blood Pressure SpO2   04/03/24 2147 04/03/24 2147 04/03/24 2147 04/03/24 2147 04/03/24 2147   97.5 °F (36.4 °C) (!) 130 19 147/82 98 %      Temp Source Heart Rate Source Patient Position - Orthostatic VS BP Location FiO2 (%)   04/03/24 2147 04/03/24 2147 04/03/24 2147 04/03/24 2147 --   Oral Monitor Sitting Left arm       Pain Score       04/03/24 2300       4          Wt Readings from Last 1 Encounters:   04/04/24 106 kg (233 lb 14.5 oz)     Additional Vital Signs:   04/05/24 07:06:24 98.9 °F (37.2 °C) 102 17 145/82 103 95 % -- --   04/05/24 00:33:33 99.6 °F (37.6 °C) 112 Abnormal  -- -- -- 95 % -- --   04/04/24 17:27:44 100.6 °F (38.1 °C) Abnormal  124 Abnormal  17 150/100 117 94 % None (Room air) --   04/04/24 1545 -- 122 Abnormal  20 138/65 93 96 % None (Room air) Lying   04/04/24 1500 -- 118 Abnormal  16 140/75 99 96 % None (Room air) Lying   04/04/24 1400 -- 105 22 128/66 91 96 % None (Room air) Lying   04/04/24 1336 -- 111 Abnormal  23 Abnormal  137/74 97 99 % None (Room air) Sitting   04/04/24 1100 -- 104 29 Abnormal  146/67 97 95 % None (Room air) Sitting   04/04/24 1030 -- 111 Abnormal  20 149/63 -- 95 % None (Room air) Sitting   04/04/24 0845 -- 120 Abnormal  16 138/63 90 95 % None (Room air) Sitting   04/04/24 0835 101.5 °F (38.6 °C) Abnormal  -- -- 138/63 -- -- -- --   04/04/24 0230 -- 96 20 -- -- 99 % -- --   04/04/24 0115 -- 112 Abnormal  20 150/80 107 99 % None (Room air) Sitting   04/04/24 0015 -- 107 Abnormal  21 133/99 112 97 % None (Room air) Sitting   04/04/24 0000 -- 105 20 141/73 99  96 % None (Room air) Sitting   04/03/24 2330 98 °F (36.7 °C) 104 22 141/78 -- 96 % None (Room air) Sitting   04/03/24 2300 98 °F (36.7 °C) 120 Abnormal  22 146/92 -- 95 % None (Room air) Sitting   04/03/24 2206 -- 137 Abnormal  21 134/96 107 95 % None (Room air) Sitting     Pertinent Labs/Diagnostic Test Results:   4/3 EKG Sinus tachycardia   XR chest portable   ED Interpretation by Hodan Gay PA-C (04/03 2306)   No acute cardiopulmonary abnormality      Final Result by Marcos Sy DO (04/04 0843)      No acute cardiopulmonary disease.            Workstation performed: [id number]         CT abdomen pelvis with contrast   Final Result by Gage Mckoy MD (04/03 2355)      1.  Acute perforated diverticulitis of the splenic flexure/proximal descending colon as detailed above. Recommend surgical consultation.   2.  Mild diffuse hepatic steatosis.   3.  Indeterminate 2.6 x 1.9 cm left adrenal intermediate density nodule. Consider nonemergent adrenal washout CT or chemical shift MRI for further characterization.      Findings discussed with JULIAN Gay at 11:54 p.m.      Workstation performed: [id number]           Results from last 7 days   Lab Units 04/03/24  2241   SARS-COV-2  Negative     Results from last 7 days   Lab Units 04/05/24 0459 04/04/24 0429 04/04/24  0237 04/03/24  2151   WBC Thousand/uL 14.26* 19.53*  --  26.37*   HEMOGLOBIN g/dL 12.3 14.4  --  16.4   HEMATOCRIT % 35.6* 41.9  --  47.8   PLATELETS Thousands/uL 197 199 184 267   NEUTROS ABS Thousands/µL 11.37* 16.49*  --  21.56*         Results from last 7 days   Lab Units 04/05/24 0459 04/04/24 0429 04/03/24  2151   SODIUM mmol/L 136 137 134*   POTASSIUM mmol/L 3.7 3.8 3.9   CHLORIDE mmol/L 106 106 100   CO2 mmol/L 21 22 23   ANION GAP mmol/L 9 9 11   BUN mg/dL 11 9 11   CREATININE mg/dL 0.88 0.96 1.24   EGFR ml/min/1.73sq m 110 101 74   CALCIUM mg/dL 8.4 8.4 9.2   MAGNESIUM mg/dL  --  1.6*  --    PHOSPHORUS mg/dL  --  1.6*  --      Results from  last 7 days   Lab Units 04/05/24  0459 04/04/24  0429 04/03/24  2151   AST U/L 9* 11* 10*   ALT U/L 13 17 20   ALK PHOS U/L 74 90 111*   TOTAL PROTEIN g/dL 6.4 6.8 8.1   ALBUMIN g/dL 3.4* 3.7 4.6   TOTAL BILIRUBIN mg/dL 0.83 1.49* 1.83*         Results from last 7 days   Lab Units 04/05/24  0459 04/04/24  0429 04/03/24  2151   GLUCOSE RANDOM mg/dL 92 122 123         Results from last 7 days   Lab Units 04/04/24  0237 04/04/24  0043 04/03/24  2241   HS TNI 0HR ng/L  --   --  5   HS TNI 2HR ng/L  --  4  --    HSTNI D2 ng/L  --  -1  --    HS TNI 4HR ng/L 4  --   --    HSTNI D4 ng/L -1  --   --        Results from last 7 days   Lab Units 04/03/24  2241   PROTIME seconds 15.0*   INR  1.11   PTT seconds 34     Results from last 7 days   Lab Units 04/03/24  2241   PROCALCITONIN ng/ml 2.80*     Results from last 7 days   Lab Units 04/03/24  2241   LACTIC ACID mmol/L 1.8     Results from last 7 days   Lab Units 04/03/24  2241   BNP pg/mL 27     Results from last 7 days   Lab Units 04/03/24  2151   LIPASE u/L <6*       Results from last 7 days   Lab Units 04/03/24  2336   CLARITY UA  Clear   COLOR UA  Light Yellow   SPEC GRAV UA  >=1.050*   PH UA  7.0   GLUCOSE UA mg/dl Negative   KETONES UA mg/dl 20 (1+)*   BLOOD UA  Trace*   PROTEIN UA mg/dl 50 (1+)*   NITRITE UA  Negative   BILIRUBIN UA  Negative   UROBILINOGEN UA (BE) mg/dl 2.0*   LEUKOCYTES UA  Negative   WBC UA /hpf None Seen   RBC UA /hpf 1-2   BACTERIA UA /hpf None Seen   EPITHELIAL CELLS WET PREP /hpf None Seen     Results from last 7 days   Lab Units 04/03/24  2241   INFLUENZA A PCR  Negative   INFLUENZA B PCR  Negative   RSV PCR  Negative     Results from last 7 days   Lab Units 04/03/24  2251 04/03/24  2241   BLOOD CULTURE  Received in Microbiology Lab. Culture in Progress. Received in Microbiology Lab. Culture in Progress.       ED Treatment:   Medication Administration from 04/03/2024 2142 to 04/04/2024 1720         Date/Time Order Dose Route Action      04/03/2024 2308 EDT sodium chloride 0.9 % bolus 1,000 mL 1,000 mL Intravenous New Bag     04/03/2024 2242 EDT acetaminophen (Ofirmev) injection 1,000 mg 1,000 mg Intravenous New Bag     04/03/2024 2249 EDT sodium chloride 0.9 % bolus 1,000 mL 1,000 mL Intravenous New Bag     04/03/2024 2352 EDT piperacillin-tazobactam (ZOSYN) IVPB 4.5 g 4.5 g Intravenous New Bag     04/04/2024 0044 EDT sodium chloride 0.9 % bolus 1,000 mL 1,000 mL Intravenous New Bag     04/04/2024 1425 EDT lactated ringers infusion 125 mL/hr Intravenous New Bag     04/04/2024 0121 EDT lactated ringers infusion 125 mL/hr Intravenous New Bag     04/04/2024 1335 EDT heparin (porcine) subcutaneous injection 5,000 Units 5,000 Units Subcutaneous Given     04/04/2024 0853 EDT pantoprazole (PROTONIX) injection 40 mg 40 mg Intravenous Given     04/04/2024 1328 EDT piperacillin-tazobactam (ZOSYN) IVPB (EXTENDED INFUSION) 4.5 g 4.5 g Intravenous Given     04/04/2024 0532 EDT piperacillin-tazobactam (ZOSYN) IVPB (EXTENDED INFUSION) 4.5 g 4.5 g Intravenous Given     04/04/2024 1706 EDT ALPRAZolam (XANAX) tablet 0.25 mg 0.25 mg Oral Given     04/04/2024 1706 EDT acetaminophen (TYLENOL) tablet 975 mg 975 mg Oral Given     04/04/2024 0853 EDT acetaminophen (TYLENOL) tablet 975 mg 975 mg Oral Given          Past Medical History:   Diagnosis Date    Anxiety      Present on Admission:  **None**      Admitting Diagnosis: Diverticulitis [K57.92]  Abdominal pain [R10.9]  Sepsis (HCC) [A41.9]  Age/Sex: 38 y.o. male  Admission Orders:  Scheduled Medications:  heparin (porcine), 5,000 Units, Subcutaneous, Q8H JORGE  pantoprazole, 40 mg, Intravenous, Q24H JORGE  piperacillin-tazobactam, 4.5 g, Intravenous, Once   Followed by  piperacillin-tazobactam, 4.5 g, Intravenous, Q8H      Continuous IV Infusions:  lactated ringers, 125 mL/hr, Intravenous, Continuous      PRN Meds:  acetaminophen, 975 mg, Oral, Q8H PRN  ALPRAZolam, 0.25 mg, Oral, HS PRN  morphine injection, 2 mg,  Intravenous, Q2H PRN  ondansetron, 4 mg, Intravenous, Q6H PRN    NPO   I&O   Tele   Cont pulse ox           Network Utilization Review Department  ATTENTION: Please call with any questions or concerns to [phone] and carefully listen to the prompts so that you are directed to the right person. All voicemails are confidential.   For Discharge needs, contact Care Management DC Support Team at [phone] opt. 2  Send all requests for admission clinical reviews, approved or denied determinations and any other requests to dedicated fax number below belonging to the [facility] where the patient is receiving treatment. List of dedicated fax numbers for the Facilities:  FACILITY NAME UR FAX NUMBER   ADMISSION DENIALS (Administrative/Medical Necessity) [phone]   DISCHARGE SUPPORT TEAM (NETWORK) [phone]   PARENT CHILD HEALTH (Maternity/NICU/Pediatrics) [phone]   [facility] [phone]   [facility] [phone]   [facility] [phone]   [facility] [phone]   [facility] [phone]   [facility] [phone]   [facility] [phone]   [facility] [phone]   [facility] [phone]   [facility] [phone]   [facility] [phone]   [facility] [phone]

## 2024-04-05 NOTE — UTILIZATION REVIEW
NOTIFICATION OF INPATIENT ADMISSION   AUTHORIZATION REQUEST   SERVICING FACILITY:   [place] [address]  Tax ID: [id number]  NPI: [id number] ATTENDING PROVIDER:  Attending Name and NPI#: Carlos Sutton Md [[id number]]  Address: [address] [place] [address]  Phone: [phone]     ADMISSION INFORMATION:  Place of Service: Inpatient [facility]  Place of Service Code: 21  Inpatient Admission Date/Time: 4/4/24 12:57 AM  Discharge Date/Time: No discharge date for patient encounter.  Admitting Diagnosis Code/Description:  Diverticulitis [K57.92]  Abdominal pain [R10.9]  Sepsis (HCC) [A41.9]     UTILIZATION REVIEW CONTACT:  Frieda Ellis Utilization   Network Utilization Review Department  Phone: [phone]  Fax [phone]  Email: [email or web address]  Contact for approvals/pending authorizations, clinical reviews, and discharge.     PHYSICIAN ADVISORY SERVICES:  Medical Necessity Denial & Pmwe-hc-Ijvs Review  Phone: [phone]  Fax: [phone]  Email: [email or web address]     DISCHARGE SUPPORT TEAM:  For Patients Discharge Needs & Updates  Phone: [phone] opt. 2 Fax: [phone]  Email: [email or web address]

## 2024-04-05 NOTE — CASE MANAGEMENT
Case Management Assessment & Discharge Planning Note    Patient name Melanie Langford  Location /-02 MRN [id number]  : 1985 Date 2024       Current Admission Date: 4/3/2024  Current Admission Diagnosis:Diverticulitis, Abdominal pain, Sepsis (HCC)   Patient Active Problem List    Diagnosis Date Noted    Nicotine abuse 2019    Stress-related physiological response affecting physical condition 2019    Sebaceous cyst 2019    Class 1 obesity due to excess calories without serious comorbidity with body mass index (BMI) of 31.0 to 31.9 in adult 2019      LOS (days): 1  Geometric Mean LOS (GMLOS) (days):   Days to GMLOS:     OBJECTIVE:    Risk of Unplanned Readmission Score: 7.96         Current admission status: Inpatient  Referral Reason: Other (D/C planning)    Preferred Pharmacy:   [id number] [place] [facility] [address]  [address] [address]  Phone: [phone] Fax: [phone]    [id number] [place] [place] - [address]  [address]  [facility] [address]  Phone: [phone] Fax: [phone]    Primary Care Provider: No primary care provider on file.    Primary Insurance: SCOTTIE PALOMARES  Secondary Insurance:     ASSESSMENT:  Active Health Care Proxies    There are no active Health Care Proxies on file.       Advance Directives  Does patient have a Health Care POA?: No  Was patient offered paperwork?: Yes (Pt declined paperwork)  Does patient currently have a Health Care decision maker?: No  Does patient have Advance Directives?: No  Was patient offered paperwork?: Yes (Pt declined paperwork)  Primary Contact: Sarah Dexter         Readmission Root Cause  30 Day Readmission: No    Patient Information  Admitted from:: Home  Mental Status: Alert  During Assessment patient was accompanied by: Not accompanied during assessment  Assessment information provided by:: Patient  Primary Caregiver: Self  Support Systems:  Spouse/significant other, Family members, Children  County of Residence: [place]  What city do you live in?: [place]  Home entry access options. Select all that apply.: Stairs  Number of steps to enter home.: 1  Do the steps have railings?: No  Type of Current Residence[facility]  Living Arrangements: Lives w/ Spouse/significant other, Lives w/ Extended Family, Other (Comment) (Lives with children)  Is patient a ?: No    Activities of Daily Living Prior to Admission  Functional Status: Independent  Completes ADLs independently?: Yes  Ambulates independently?: Yes  Does patient use assisted devices?: No  Does patient currently own DME?: No  Does patient have a history of Outpatient Therapy (PT/OT)?: No  Does the patient have a history of Short-Term Rehab?: No  Does patient have a history of HHC?: No  Does patient currently have HHC?: No         Patient Information Continued  Does patient have prescription coverage?: Yes  Does patient receive dialysis treatments?: No  Does patient have a history of substance abuse?: No  Does patient have a history of Mental Health Diagnosis?: No         Means of Transportation  Means of Transport to Appts:: Drives Self      Social Determinants of Health (SDOH)      Flowsheet Row Most Recent Value   Housing Stability    In the last 12 months, was there a time when you were not able to pay the mortgage or rent on time? N   In the last 12 months, how many places have you lived? 1   In the last 12 months, was there a time when you did not have a steady place to sleep or slept in a shelter (including now)? N   Transportation Needs    In the past 12 months, has lack of transportation kept you from medical appointments or from getting medications? no   In the past 12 months, has lack of transportation kept you from meetings, work, or from getting things needed for daily living? No   Food Insecurity    Within the past 12 months, you worried that your food would run out before you got the  money to buy more. Never true   Within the past 12 months, the food you bought just didn't last and you didn't have money to get more. Never true   Utilities    In the past 12 months has the electric, gas, oil, or water company threatened to shut off services in your home? No            DISCHARGE DETAILS:    Discharge planning discussed with:: Pt  Freedom of Choice: Yes                   Contacts  Patient Contacts: Sarah Dexter  Relationship to Patient:: Family  Contact Method: Phone  Phone Number: [phone]  Reason/Outcome: Discharge Planning    Requested Home Health Care         Is the patient interested in HHC at discharge?: No    DME Referral Provided  Referral made for DME?: No              Treatment Team Recommendation: Home  Discharge Destination Plan:: Home  Transport at Discharge : Family                                      Additional Comments: CM met with pt at bedside. Pt reported that his wife will transport at D/C. Pt reported no CM needs at this time.

## 2024-04-05 NOTE — PLAN OF CARE
Problem: PAIN - ADULT  Goal: Verbalizes/displays adequate comfort level or baseline comfort level  Description: Interventions:  - Encourage patient to monitor pain and request assistance  - Assess pain using appropriate pain scale  - Administer analgesics based on type and severity of pain and evaluate response  - Implement non-pharmacological measures as appropriate and evaluate response  - Consider cultural and social influences on pain and pain management  - Notify physician/advanced practitioner if interventions unsuccessful or patient reports new pain  Outcome: Progressing     Problem: INFECTION - ADULT  Goal: Absence or prevention of progression during hospitalization  Description: INTERVENTIONS:  - Assess and monitor for signs and symptoms of infection  - Monitor lab/diagnostic results  - Monitor all insertion sites, i.e. indwelling lines, tubes, and drains  - Monitor endotracheal if appropriate and nasal secretions for changes in amount and color  - [place] appropriate cooling/warming therapies per order  - Administer medications as ordered  - Instruct and encourage patient and family to use good hand hygiene technique  - Identify and instruct in appropriate isolation precautions for identified infection/condition  Outcome: Progressing  Goal: Absence of fever/infection during neutropenic period  Description: INTERVENTIONS:  - Monitor WBC    Outcome: Progressing     Problem: SAFETY ADULT  Goal: Patient will remain free of falls  Description: INTERVENTIONS:  - Educate patient/family on patient safety including physical limitations  - Instruct patient to call for assistance with activity   - Consult OT/PT to assist with strengthening/mobility   - Keep Call bell within reach  - Keep bed low and locked with side rails adjusted as appropriate  - Keep care items and personal belongings within reach  - Initiate and maintain comfort rounds  - Make Fall Risk Sign visible to staff  - Offer Toileting every 2 Hours,  in advance of need  - Initiate/Maintain bed alarm  - Obtain necessary fall risk management equipment: nonskid socks  - Apply yellow socks and bracelet for high fall risk patients  - Consider moving patient to room near nurses station  Outcome: Progressing  Goal: Maintain or return to baseline ADL function  Description: INTERVENTIONS:  -  Assess patient's ability to carry out ADLs; assess patient's baseline for ADL function and identify physical deficits which impact ability to perform ADLs (bathing, care of mouth/teeth, toileting, grooming, dressing, etc.)  - Assess/evaluate cause of self-care deficits   - Assess range of motion  - Assess patient's mobility; develop plan if impaired  - Assess patient's need for assistive devices and provide as appropriate  - Encourage maximum independence but intervene and supervise when necessary  - Involve family in performance of ADLs  - Assess for home care needs following discharge   - Consider OT consult to assist with ADL evaluation and planning for discharge  - Provide patient education as appropriate  Outcome: Progressing  Goal: Maintains/Returns to pre admission functional level  Description: INTERVENTIONS:  - Perform AM-PAC 6 Click Basic Mobility/ Daily Activity assessment daily.  - Set and communicate daily mobility goal to care team and patient/family/caregiver.   - Collaborate with rehabilitation services on mobility goals if consulted  - Perform Range of Motion 3 times a day.  - Reposition patient every 3 hours.  - Dangle patient 3 times a day  - Stand patient 3 times a day  - Ambulate patient 3 times a day  - Out of bed to chair 3 times a day   - Out of bed for meals 3 times a day  - Out of bed for toileting  - Record patient progress and toleration of activity level   Outcome: Progressing     Problem: DISCHARGE PLANNING  Goal: Discharge to home or other facility with appropriate resources  Description: INTERVENTIONS:  - Identify barriers to discharge w/patient and  caregiver  - Arrange for needed discharge resources and transportation as appropriate  - Identify discharge learning needs (meds, wound care, etc.)  - Arrange for interpretive services to assist at discharge as needed  - Refer to Case Management Department for coordinating discharge planning if the patient needs post-hospital services based on physician/advanced practitioner order or complex needs related to functional status, cognitive ability, or social support system  Outcome: Progressing     Problem: Knowledge Deficit  Goal: Patient/family/caregiver demonstrates understanding of disease process, treatment plan, medications, and discharge instructions  Description: Complete learning assessment and assess knowledge base.  Interventions:  - Provide teaching at level of understanding  - Provide teaching via preferred learning methods  Outcome: Progressing

## 2024-04-05 NOTE — PROGRESS NOTES
Progress Note - General Surgery   Melanie Langford 38 y.o. male MRN: [id number]  Unit/Bed#: -02 Encounter: [id number]      Assessment:   Melanie Langford is a 38 y.o. male with Acute diverticulitis at splenic flexure/proximal descending colon with perforation   - CT of the abdomen and pelvis showed acute perforated diverticulitis of the splenic flexure/proximal descending colon with abnormal inflammatory debris extending inferiorly along the retroperitoneum into the left lower quadrant and left pelvic region. Several foci of free air adjacent to the posterior wall of the proximal descending colon in the region of a enlarged inflamed diverticula, no drainable fluid collection/abscess    SIRS meeting sepsis criteria 2/2 acute diverticulitis give   -Tachycardia  -Leukocytosis  -Fever  -Tachypnea    Plan:  -Continue n.p.o., IVF, and IV antibiotics.  Patient has had significant clinical improvement with conservative management  -Serial abdominal exams  -Continue to monitor vitals and lab results  -Closely monitor leukocytosis and fever curve, as well as tachycardia  -Urged ambulation and out of bed  -The patient continues to improve we will likely start clear liquid diet tomorrow morning, to allow patient to have bowel rest for least 24 hours      Subjective/Objective   Chief Complaint: None    Subjective: Events overnight.  Patient reports feeling much better than when he first arrived to the emergency department.  Patient reports abdominal pain has decreased from an 8 or 9-8 to 2 or 3 out of 10, with minimal to no pain at rest.  Patient reports bowel movements and flatus.  He denies any chest pain, shortness of breath, palpitations, fevers, or chills.    Objective:     Blood pressure 145/82, pulse 102, temperature 98.9 °F (37.2 °C), resp. rate 17, height 6' (1.829 m), weight 106 kg (233 lb 14.5 oz), SpO2 95%.  Body mass index is 31.72 kg/m².    I/O         04/03 0701 04/04 0700 04/04 0701 04/05 0700 04/05 0701 04/06 0700     P.O.  0     IV Piggyback 100      Total Intake(mL/kg) 100 (0.9) 0 (0)     Urine (mL/kg/hr) 1200 400 (0.2)     Total Output 1200 400     Net -1100 -400                    Invasive Devices       Peripheral Intravenous Line  Duration             Peripheral IV 04/03/24 Proximal;Right;Ventral (anterior) Forearm 1 day    Peripheral IV 04/04/24 Distal;Dorsal (posterior);Left Forearm 1 day                    Physical Exam: /82   Pulse 102   Temp 98.9 °F (37.2 °C)   Resp 17   Ht 6' (1.829 m)   Wt 106 kg (233 lb 14.5 oz)   SpO2 95%   BMI 31.72 kg/m²   General appearance: alert and oriented, in no acute distress  Lungs: clear to auscultation bilaterally  Heart: regular rate and rhythm  Abdomen: soft, non-tender; bowel sounds normal; no masses,  no organomegaly  Extremities: extremities normal, warm and well-perfused; no cyanosis, clubbing, or edema    Labs   Recent Results (from the past 24 hour(s))   CBC and differential    Collection Time: 04/05/24  4:59 AM   Result Value Ref Range    WBC 14.26 (H) 4.31 - 10.16 Thousand/uL    RBC 4.10 3.88 - 5.62 Million/uL    Hemoglobin 12.3 12.0 - 17.0 g/dL    Hematocrit 35.6 (L) 36.5 - 49.3 %    MCV 87 82 - 98 fL    MCH 30.0 26.8 - 34.3 pg    MCHC 34.6 31.4 - 37.4 g/dL    RDW 12.9 11.6 - 15.1 %    MPV 10.3 8.9 - 12.7 fL    Platelets 197 149 - 390 Thousands/uL    nRBC 0 /100 WBCs    Neutrophils Relative 80 (H) 43 - 75 %    Immature Grans % 0 0 - 2 %    Lymphocytes Relative 12 (L) 14 - 44 %    Monocytes Relative 7 4 - 12 %    Eosinophils Relative 1 0 - 6 %    Basophils Relative 0 0 - 1 %    Neutrophils Absolute 11.37 (H) 1.85 - 7.62 Thousands/µL    Absolute Immature Grans 0.06 0.00 - 0.20 Thousand/uL    Absolute Lymphocytes 1.74 0.60 - 4.47 Thousands/µL    Absolute Monocytes 0.97 0.17 - 1.22 Thousand/µL    Eosinophils Absolute 0.07 0.00 - 0.61 Thousand/µL    Basophils Absolute 0.05 0.00 - 0.10 Thousands/µL   Comprehensive metabolic panel    Collection Time: 04/05/24  4:59 AM    Result Value Ref Range    Sodium 136 135 - 147 mmol/L    Potassium 3.7 3.5 - 5.3 mmol/L    Chloride 106 96 - 108 mmol/L    CO2 21 21 - 32 mmol/L    ANION GAP 9 4 - 13 mmol/L    BUN 11 5 - 25 mg/dL    Creatinine 0.88 0.60 - 1.30 mg/dL    Glucose 92 65 - 140 mg/dL    Calcium 8.4 8.4 - 10.2 mg/dL    Corrected Calcium 8.9 8.3 - 10.1 mg/dL    AST 9 (L) 13 - 39 U/L    ALT 13 7 - 52 U/L    Alkaline Phosphatase 74 34 - 104 U/L    Total Protein 6.4 6.4 - 8.4 g/dL    Albumin 3.4 (L) 3.5 - 5.0 g/dL    Total Bilirubin 0.83 0.20 - 1.00 mg/dL    eGFR 110 ml/min/1.73sq m        Imaging and other studies:  XR chest portable    Result Date: 4/4/2024  Impression: No acute cardiopulmonary disease. Workstation performed: [id number]     CT abdomen pelvis with contrast    Result Date: 4/3/2024  Impression: 1.  Acute perforated diverticulitis of the splenic flexure/proximal descending colon as detailed above. Recommend surgical consultation. 2.  Mild diffuse hepatic steatosis. 3.  Indeterminate 2.6 x 1.9 cm left adrenal intermediate density nodule. Consider nonemergent adrenal washout CT or chemical shift MRI for further characterization. Findings discussed with JULIAN Gay at 11:54 p.m. Workstation performed: [id number]       VTE Pharmacologic Prophylaxis: Heparin  VTE Mechanical Prophylaxis: sequential compression device    Elio Goodwin PA-C  4/5/2024

## 2024-04-06 LAB
ANION GAP SERPL CALCULATED.3IONS-SCNC: 8 MMOL/L (ref 4–13)
BASOPHILS # BLD AUTO: 0.04 THOUSANDS/ÂΜL (ref 0–0.1)
BASOPHILS NFR BLD AUTO: 0 % (ref 0–1)
BUN SERPL-MCNC: 9 MG/DL (ref 5–25)
CALCIUM SERPL-MCNC: 8.6 MG/DL (ref 8.4–10.2)
CHLORIDE SERPL-SCNC: 106 MMOL/L (ref 96–108)
CO2 SERPL-SCNC: 23 MMOL/L (ref 21–32)
CREAT SERPL-MCNC: 0.88 MG/DL (ref 0.6–1.3)
EOSINOPHIL # BLD AUTO: 0.2 THOUSAND/ÂΜL (ref 0–0.61)
EOSINOPHIL NFR BLD AUTO: 2 % (ref 0–6)
ERYTHROCYTE [DISTWIDTH] IN BLOOD BY AUTOMATED COUNT: 12.4 % (ref 11.6–15.1)
GFR SERPL CREATININE-BSD FRML MDRD: 110 ML/MIN/1.73SQ M
GLUCOSE SERPL-MCNC: 122 MG/DL (ref 65–140)
HCT VFR BLD AUTO: 37.3 % (ref 36.5–49.3)
HGB BLD-MCNC: 12.8 G/DL (ref 12–17)
IMM GRANULOCYTES # BLD AUTO: 0.05 THOUSAND/UL (ref 0–0.2)
IMM GRANULOCYTES NFR BLD AUTO: 1 % (ref 0–2)
LYMPHOCYTES # BLD AUTO: 1.74 THOUSANDS/ÂΜL (ref 0.6–4.47)
LYMPHOCYTES NFR BLD AUTO: 18 % (ref 14–44)
MCH RBC QN AUTO: 29.8 PG (ref 26.8–34.3)
MCHC RBC AUTO-ENTMCNC: 34.3 G/DL (ref 31.4–37.4)
MCV RBC AUTO: 87 FL (ref 82–98)
MONOCYTES # BLD AUTO: 0.64 THOUSAND/ÂΜL (ref 0.17–1.22)
MONOCYTES NFR BLD AUTO: 7 % (ref 4–12)
NEUTROPHILS # BLD AUTO: 7.11 THOUSANDS/ÂΜL (ref 1.85–7.62)
NEUTS SEG NFR BLD AUTO: 72 % (ref 43–75)
NRBC BLD AUTO-RTO: 0 /100 WBCS
PLATELET # BLD AUTO: 272 THOUSANDS/UL (ref 149–390)
PMV BLD AUTO: 10 FL (ref 8.9–12.7)
POTASSIUM SERPL-SCNC: 4 MMOL/L (ref 3.5–5.3)
RBC # BLD AUTO: 4.29 MILLION/UL (ref 3.88–5.62)
SODIUM SERPL-SCNC: 137 MMOL/L (ref 135–147)
WBC # BLD AUTO: 9.78 THOUSAND/UL (ref 4.31–10.16)

## 2024-04-06 PROCEDURE — 80048 BASIC METABOLIC PNL TOTAL CA: CPT | Performed by: PHYSICIAN ASSISTANT

## 2024-04-06 PROCEDURE — 85025 COMPLETE CBC W/AUTO DIFF WBC: CPT | Performed by: PHYSICIAN ASSISTANT

## 2024-04-06 PROCEDURE — C9113 INJ PANTOPRAZOLE SODIUM, VIA: HCPCS | Performed by: SURGERY

## 2024-04-06 RX ADMIN — PIPERACILLIN AND TAZOBACTAM 4.5 G: 36; 4.5 INJECTION, POWDER, FOR SOLUTION INTRAVENOUS at 14:00

## 2024-04-06 RX ADMIN — HEPARIN SODIUM 5000 UNITS: 5000 INJECTION INTRAVENOUS; SUBCUTANEOUS at 06:11

## 2024-04-06 RX ADMIN — DEXTROSE, SODIUM CHLORIDE, AND POTASSIUM CHLORIDE 135 ML/HR: 5; .45; .15 INJECTION INTRAVENOUS at 07:13

## 2024-04-06 RX ADMIN — PIPERACILLIN AND TAZOBACTAM 4.5 G: 36; 4.5 INJECTION, POWDER, FOR SOLUTION INTRAVENOUS at 06:00

## 2024-04-06 RX ADMIN — HEPARIN SODIUM 5000 UNITS: 5000 INJECTION INTRAVENOUS; SUBCUTANEOUS at 21:08

## 2024-04-06 RX ADMIN — PANTOPRAZOLE SODIUM 40 MG: 40 INJECTION, POWDER, FOR SOLUTION INTRAVENOUS at 08:56

## 2024-04-06 RX ADMIN — DEXTROSE, SODIUM CHLORIDE, AND POTASSIUM CHLORIDE 135 ML/HR: 5; .45; .15 INJECTION INTRAVENOUS at 00:03

## 2024-04-06 RX ADMIN — PIPERACILLIN AND TAZOBACTAM 4.5 G: 36; 4.5 INJECTION, POWDER, FOR SOLUTION INTRAVENOUS at 22:01

## 2024-04-06 NOTE — PLAN OF CARE
Problem: PAIN - ADULT  Goal: Verbalizes/displays adequate comfort level or baseline comfort level  Description: Interventions:  - Encourage patient to monitor pain and request assistance  - Assess pain using appropriate pain scale  - Administer analgesics based on type and severity of pain and evaluate response  - Implement non-pharmacological measures as appropriate and evaluate response  - Consider cultural and social influences on pain and pain management  - Notify physician/advanced practitioner if interventions unsuccessful or patient reports new pain  Outcome: Progressing     Problem: INFECTION - ADULT  Goal: Absence or prevention of progression during hospitalization  Description: INTERVENTIONS:  - Assess and monitor for signs and symptoms of infection  - Monitor lab/diagnostic results  - Monitor all insertion sites, i.e. indwelling lines, tubes, and drains  - Monitor endotracheal if appropriate and nasal secretions for changes in amount and color  - [place] appropriate cooling/warming therapies per order  - Administer medications as ordered  - Instruct and encourage patient and family to use good hand hygiene technique  - Identify and instruct in appropriate isolation precautions for identified infection/condition  Outcome: Progressing  Goal: Absence of fever/infection during neutropenic period  Description: INTERVENTIONS:  - Monitor WBC    Outcome: Progressing     Problem: SAFETY ADULT  Goal: Patient will remain free of falls  Description: INTERVENTIONS:  - Educate patient/family on patient safety including physical limitations  - Instruct patient to call for assistance with activity   - Consult OT/PT to assist with strengthening/mobility   - Keep Call bell within reach  - Keep bed low and locked with side rails adjusted as appropriate  - Keep care items and personal belongings within reach  - Initiate and maintain comfort rounds  - Make Fall Risk Sign visible to staff  - Offer Toileting every 2 Hours,  in advance of need  - Initiate/Maintain bedalarm  - Obtain necessary fall risk management equipment:   - Apply yellow socks and bracelet for high fall risk patients  - Consider moving patient to room near nurses station  Outcome: Progressing  Goal: Maintain or return to baseline ADL function  Description: INTERVENTIONS:  -  Assess patient's ability to carry out ADLs; assess patient's baseline for ADL function and identify physical deficits which impact ability to perform ADLs (bathing, care of mouth/teeth, toileting, grooming, dressing, etc.)  - Assess/evaluate cause of self-care deficits   - Assess range of motion  - Assess patient's mobility; develop plan if impaired  - Assess patient's need for assistive devices and provide as appropriate  - Encourage maximum independence but intervene and supervise when necessary  - Involve family in performance of ADLs  - Assess for home care needs following discharge   - Consider OT consult to assist with ADL evaluation and planning for discharge  - Provide patient education as appropriate  Outcome: Progressing  Goal: Maintains/Returns to pre admission functional level  Description: INTERVENTIONS:  - Perform AM-PAC 6 Click Basic Mobility/ Daily Activity assessment daily.  - Set and communicate daily mobility goal to care team and patient/family/caregiver.   - Collaborate with rehabilitation services on mobility goals if consulted  - Perform Range of Motion 3 times a day.  - Reposition patient every 2 hours.  - Dangle patient 3 times a day  - Stand patient 3 times a day  - Ambulate patient 3 times a day  - Out of bed to chair 3 times a day   - Out of bed for meals 3 times a day  - Out of bed for toileting  - Record patient progress and toleration of activity level   Outcome: Progressing     Problem: DISCHARGE PLANNING  Goal: Discharge to home or other facility with appropriate resources  Description: INTERVENTIONS:  - Identify barriers to discharge w/patient and caregiver  -  Arrange for needed discharge resources and transportation as appropriate  - Identify discharge learning needs (meds, wound care, etc.)  - Arrange for interpretive services to assist at discharge as needed  - Refer to Case Management Department for coordinating discharge planning if the patient needs post-hospital services based on physician/advanced practitioner order or complex needs related to functional status, cognitive ability, or social support system  Outcome: Progressing     Problem: Knowledge Deficit  Goal: Patient/family/caregiver demonstrates understanding of disease process, treatment plan, medications, and discharge instructions  Description: Complete learning assessment and assess knowledge base.  Interventions:  - Provide teaching at level of understanding  - Provide teaching via preferred learning methods  Outcome: Progressing

## 2024-04-06 NOTE — PLAN OF CARE
Problem: GASTROINTESTINAL - ADULT  Goal: Maintains or returns to baseline bowel function  Description: INTERVENTIONS:  - Assess bowel function  - Encourage oral fluids to ensure adequate hydration  - Administer IV fluids if ordered to ensure adequate hydration  - Administer ordered medications as needed  - Encourage mobilization and activity  - Consider nutritional services referral to assist patient with adequate nutrition and appropriate food choices  Outcome: Progressing  Goal: Maintains adequate nutritional intake  Description: INTERVENTIONS:  - Monitor percentage of each meal consumed  - Identify factors contributing to decreased intake, treat as appropriate  - Assist with meals as needed  - Monitor I&O, weight, and lab values if indicated  - Obtain nutrition services referral as needed  Outcome: Progressing

## 2024-04-06 NOTE — PROGRESS NOTES
Progress Note -Surgery PA  Melanie Langford 38 y.o. male MRN: [id number]  Unit/Bed#: -02 Encounter: [id number]      Assessment   38y M  with Acute diverticulitis at splenic flexure/proximal descending colon with perforation   SIRS meeting sepsis criteria 2/2 acute diverticulitis, POA - resolving  -  CT AP 4/3/24: acute perforated diverticulitis of the splenic flexure/proximal descending colon with abnormal inflammatory debris extending inferiorly along the retroperitoneum into the left lower quadrant and left pelvic region. Several foci of free air adjacent to the posterior wall of the proximal descending colon in the region of a enlarged inflamed diverticula, no drainable fluid collection/abscess   - Tmax 24h 100.2F 730pm, 98.8F this morning, tachycardia improving  - Leukocytosis resolved, WBC 9.78, hemoglobin 12.8  - Abdomen soft and nontender, passing flatus and having bowel movements  Plan   Advance diet to clear liquids and monitor toleration.  Discontinue IV fluids  Continue to monitor vitals  Continue IV antibiotics, will transition to oral antibiotics on discharge  Continue to monitor labs and abdominal exam  PRN pain medication and anti-emetics  Encourage ambulation  DVT ppx: heparin  Incentive spirometry 10 times/hour while awake  Continue home medications as prescribed   Tentative discharge in the next 24 hours  ______________________________________________________________________  Subjective:   He is feeling well today.  Denies abdominal pain.  Denies nausea or vomiting.  Voiding without difficulty.  Passing flatus and having bowel movement.  Denies fevers, chills, chest pain or shortness of breath.    Objective:    Vitals:  /97   Pulse 88   Temp 98.8 °F (37.1 °C)   Resp 12   Ht 6' (1.829 m)   Wt 106 kg (233 lb 14.5 oz)   SpO2 93%   BMI 31.72 kg/m²     I/Os:  I/O last 3 completed shifts:  In: 1620 [I.V.:1620]  Out: -     I/O this shift:  In: 1905.8 [I.V.:1905.8]  Out: -     Invasive  "Devices       Peripheral Intravenous Line  Duration             Peripheral IV 04/03/24 Proximal;Right;Ventral (anterior) Forearm 2 days    Peripheral IV 04/04/24 Distal;Dorsal (posterior);Left Forearm 2 days                    Medications:  Current Facility-Administered Medications   Medication Dose Route Frequency    acetaminophen (TYLENOL) tablet 975 mg  975 mg Oral Q8H PRN    ALPRAZolam (XANAX) tablet 0.25 mg  0.25 mg Oral HS PRN    dextrose 5 % and sodium chloride 0.45 % with KCl 20 mEq/L infusion  135 mL/hr Intravenous Continuous    heparin (porcine) subcutaneous injection 5,000 Units  5,000 Units Subcutaneous Q8H JORGE    morphine injection 2 mg  2 mg Intravenous Q2H PRN    ondansetron (ZOFRAN) injection 4 mg  4 mg Intravenous Q6H PRN    pantoprazole (PROTONIX) injection 40 mg  40 mg Intravenous Q24H JORGE    piperacillin-tazobactam (ZOSYN) IVPB 4.5 g  4.5 g Intravenous Once    Followed by    piperacillin-tazobactam (ZOSYN) IVPB (EXTENDED INFUSION) 4.5 g  4.5 g Intravenous Q8H                 Lab Results and Cultures:   CBC with diff:   Lab Results   Component Value Date    WBC 9.78 04/06/2024    HGB 12.8 04/06/2024    HCT 37.3 04/06/2024    MCV 87 04/06/2024     04/06/2024    RBC 4.29 04/06/2024    MCH 29.8 04/06/2024    MCHC 34.3 04/06/2024    RDW 12.4 04/06/2024    MPV 10.0 04/06/2024    NRBC 0 04/06/2024       BMP/CMP:  Lab Results   Component Value Date    K 4.0 04/06/2024     04/06/2024    CO2 23 04/06/2024    BUN 9 04/06/2024    CREATININE 0.88 04/06/2024    CALCIUM 8.6 04/06/2024    AST 9 (L) 04/05/2024    ALT 13 04/05/2024    ALKPHOS 74 04/05/2024    EGFR 110 04/06/2024       Lipid Panel:   No results found for: \"CHOL\"    Coags:   Lab Results   Component Value Date    PTT 34 04/03/2024    INR 1.11 04/03/2024        Urinalysis:   Lab Results   Component Value Date    COLORU Light Yellow 04/03/2024    CLARITYU Clear 04/03/2024    SPECGRAV >=1.050 (H) 04/03/2024    PHUR 7.0 04/03/2024    " "LEUKOCYTESUR Negative 04/03/2024    NITRITE Negative 04/03/2024    GLUCOSEU Negative 04/03/2024    KETONESU 20 (1+) (A) 04/03/2024    BILIRUBINUR Negative 04/03/2024    BLOODU Trace (A) 04/03/2024        Urine Culture:   Lab Results   Component Value Date    URINECX <10,000 cfu/ml 02/25/2021      Wound Culure: No results found for: \"WOUNDCULT\"  Blood Culture:   Lab Results   Component Value Date    BLOODCX No Growth at 48 hrs. 04/03/2024         Physical Exam:  General Appearance:    Alert and orientated x 3, cooperative, no distress, appears stated age   Lungs:     Clear to auscultation bilaterally, respirations unlabored    Heart:    Regular rate and rhythm, S1 and S2 normal,    Abdomen:    Normoactive BS, soft, non tender, non rigid, no masses, no palpated organomegaly   Extremities:  Extremities normal, no calf tenderness, no cyanosis or edema   Pulses:   2+ and symmetric all extremities   Skin:   Skin color, texture, turgor normal, no rashes   Neurologic:   CNII-XII intact, normal strength, affect appropriate       Imaging:  XR chest portable    Result Date: 4/4/2024  Impression: No acute cardiopulmonary disease. Workstation performed: [id number]     CT abdomen pelvis with contrast    Result Date: 4/3/2024  Impression: 1.  Acute perforated diverticulitis of the splenic flexure/proximal descending colon as detailed above. Recommend surgical consultation. 2.  Mild diffuse hepatic steatosis. 3.  Indeterminate 2.6 x 1.9 cm left adrenal intermediate density nodule. Consider nonemergent adrenal washout CT or chemical shift MRI for further characterization. Findings discussed with JULIAN Gay at 11:54 p.m. Workstation performed: [id number]       VTE Pharmacologic Prophylaxis: Heparin  VTE Mechanical Prophylaxis: sequential compression device    Coral Dickens PA-C   4/6/2024      "

## 2024-04-07 VITALS
HEIGHT: 72 IN | OXYGEN SATURATION: 96 % | TEMPERATURE: 98.3 F | RESPIRATION RATE: 17 BRPM | WEIGHT: 233.91 LBS | HEART RATE: 78 BPM | SYSTOLIC BLOOD PRESSURE: 151 MMHG | BODY MASS INDEX: 31.68 KG/M2 | DIASTOLIC BLOOD PRESSURE: 92 MMHG

## 2024-04-07 PROBLEM — K57.92 ACUTE DIVERTICULITIS: Status: ACTIVE | Noted: 2024-04-07

## 2024-04-07 LAB
ABO GROUP BLD: NORMAL
ANION GAP SERPL CALCULATED.3IONS-SCNC: 10 MMOL/L (ref 4–13)
BASOPHILS # BLD AUTO: 0.06 THOUSANDS/ÂΜL (ref 0–0.1)
BASOPHILS NFR BLD AUTO: 1 % (ref 0–1)
BUN SERPL-MCNC: 8 MG/DL (ref 5–25)
CALCIUM SERPL-MCNC: 8.5 MG/DL (ref 8.4–10.2)
CHLORIDE SERPL-SCNC: 104 MMOL/L (ref 96–108)
CO2 SERPL-SCNC: 23 MMOL/L (ref 21–32)
CREAT SERPL-MCNC: 0.88 MG/DL (ref 0.6–1.3)
EOSINOPHIL # BLD AUTO: 0.31 THOUSAND/ÂΜL (ref 0–0.61)
EOSINOPHIL NFR BLD AUTO: 4 % (ref 0–6)
ERYTHROCYTE [DISTWIDTH] IN BLOOD BY AUTOMATED COUNT: 12.2 % (ref 11.6–15.1)
GFR SERPL CREATININE-BSD FRML MDRD: 110 ML/MIN/1.73SQ M
GLUCOSE SERPL-MCNC: 91 MG/DL (ref 65–140)
HCT VFR BLD AUTO: 37.4 % (ref 36.5–49.3)
HGB BLD-MCNC: 12.6 G/DL (ref 12–17)
IMM GRANULOCYTES # BLD AUTO: 0.07 THOUSAND/UL (ref 0–0.2)
IMM GRANULOCYTES NFR BLD AUTO: 1 % (ref 0–2)
LYMPHOCYTES # BLD AUTO: 1.83 THOUSANDS/ÂΜL (ref 0.6–4.47)
LYMPHOCYTES NFR BLD AUTO: 21 % (ref 14–44)
MCH RBC QN AUTO: 29.3 PG (ref 26.8–34.3)
MCHC RBC AUTO-ENTMCNC: 33.7 G/DL (ref 31.4–37.4)
MCV RBC AUTO: 87 FL (ref 82–98)
MONOCYTES # BLD AUTO: 0.63 THOUSAND/ÂΜL (ref 0.17–1.22)
MONOCYTES NFR BLD AUTO: 7 % (ref 4–12)
NEUTROPHILS # BLD AUTO: 5.81 THOUSANDS/ÂΜL (ref 1.85–7.62)
NEUTS SEG NFR BLD AUTO: 66 % (ref 43–75)
NRBC BLD AUTO-RTO: 0 /100 WBCS
PLATELET # BLD AUTO: 313 THOUSANDS/UL (ref 149–390)
PMV BLD AUTO: 9.7 FL (ref 8.9–12.7)
POTASSIUM SERPL-SCNC: 3.5 MMOL/L (ref 3.5–5.3)
RBC # BLD AUTO: 4.3 MILLION/UL (ref 3.88–5.62)
RH BLD: POSITIVE
SODIUM SERPL-SCNC: 137 MMOL/L (ref 135–147)
WBC # BLD AUTO: 8.71 THOUSAND/UL (ref 4.31–10.16)

## 2024-04-07 PROCEDURE — 80048 BASIC METABOLIC PNL TOTAL CA: CPT | Performed by: PHYSICIAN ASSISTANT

## 2024-04-07 PROCEDURE — C9113 INJ PANTOPRAZOLE SODIUM, VIA: HCPCS | Performed by: SURGERY

## 2024-04-07 PROCEDURE — 85025 COMPLETE CBC W/AUTO DIFF WBC: CPT | Performed by: PHYSICIAN ASSISTANT

## 2024-04-07 RX ORDER — AMOXICILLIN AND CLAVULANATE POTASSIUM 875; 125 MG/1; MG/1
1 TABLET, FILM COATED ORAL EVERY 12 HOURS SCHEDULED
Qty: 20 TABLET | Refills: 0 | Status: SHIPPED | OUTPATIENT
Start: 2024-04-07 | End: 2024-04-17

## 2024-04-07 RX ADMIN — PIPERACILLIN AND TAZOBACTAM 4.5 G: 36; 4.5 INJECTION, POWDER, FOR SOLUTION INTRAVENOUS at 05:15

## 2024-04-07 RX ADMIN — HEPARIN SODIUM 5000 UNITS: 5000 INJECTION INTRAVENOUS; SUBCUTANEOUS at 05:15

## 2024-04-07 RX ADMIN — PANTOPRAZOLE SODIUM 40 MG: 40 INJECTION, POWDER, FOR SOLUTION INTRAVENOUS at 08:20

## 2024-04-07 NOTE — PLAN OF CARE
Problem: PAIN - ADULT  Goal: Verbalizes/displays adequate comfort level or baseline comfort level  Description: Interventions:  - Encourage patient to monitor pain and request assistance  - Assess pain using appropriate pain scale  - Administer analgesics based on type and severity of pain and evaluate response  - Implement non-pharmacological measures as appropriate and evaluate response  - Consider cultural and social influences on pain and pain management  - Notify physician/advanced practitioner if interventions unsuccessful or patient reports new pain  Outcome: Adequate for Discharge     Problem: INFECTION - ADULT  Goal: Absence or prevention of progression during hospitalization  Description: INTERVENTIONS:  - Assess and monitor for signs and symptoms of infection  - Monitor lab/diagnostic results  - Monitor all insertion sites, i.e. indwelling lines, tubes, and drains  - Monitor endotracheal if appropriate and nasal secretions for changes in amount and color  - [place] appropriate cooling/warming therapies per order  - Administer medications as ordered  - Instruct and encourage patient and family to use good hand hygiene technique  - Identify and instruct in appropriate isolation precautions for identified infection/condition  Outcome: Adequate for Discharge  Goal: Absence of fever/infection during neutropenic period  Description: INTERVENTIONS:  - Monitor WBC    Outcome: Adequate for Discharge     Problem: SAFETY ADULT  Goal: Patient will remain free of falls  Description: INTERVENTIONS:  - Educate patient/family on patient safety including physical limitations  - Instruct patient to call for assistance with activity   - Consult OT/PT to assist with strengthening/mobility   - Keep Call bell within reach  - Keep bed low and locked with side rails adjusted as appropriate  - Keep care items and personal belongings within reach  - Initiate and maintain comfort rounds  - Make Fall Risk Sign visible to  staff  - Offer Toileting every  Hours, in advance of need  - Initiate/Maintain alarm  - Obtain necessary fall risk management equipment:   - Apply yellow socks and bracelet for high fall risk patients  - Consider moving patient to room near nurses station  Outcome: Adequate for Discharge  Goal: Maintain or return to baseline ADL function  Description: INTERVENTIONS:  -  Assess patient's ability to carry out ADLs; assess patient's baseline for ADL function and identify physical deficits which impact ability to perform ADLs (bathing, care of mouth/teeth, toileting, grooming, dressing, etc.)  - Assess/evaluate cause of self-care deficits   - Assess range of motion  - Assess patient's mobility; develop plan if impaired  - Assess patient's need for assistive devices and provide as appropriate  - Encourage maximum independence but intervene and supervise when necessary  - Involve family in performance of ADLs  - Assess for home care needs following discharge   - Consider OT consult to assist with ADL evaluation and planning for discharge  - Provide patient education as appropriate  Outcome: Adequate for Discharge  Goal: Maintains/Returns to pre admission functional level  Description: INTERVENTIONS:  - Perform AM-PAC 6 Click Basic Mobility/ Daily Activity assessment daily.  - Set and communicate daily mobility goal to care team and patient/family/caregiver.   - Collaborate with rehabilitation services on mobility goals if consulted  - Perform Range of Motion  times a day.  - Reposition patient every  hours.  - Dangle patient  times a day  - Stand patient  times a day  - Ambulate patient  times a day  - Out of bed to chair  times a day   - Out of bed for meal times a day  - Out of bed for toileting  - Record patient progress and toleration of activity level   Outcome: Adequate for Discharge     Problem: DISCHARGE PLANNING  Goal: Discharge to home or other facility with appropriate resources  Description: INTERVENTIONS:  -  Identify barriers to discharge w/patient and caregiver  - Arrange for needed discharge resources and transportation as appropriate  - Identify discharge learning needs (meds, wound care, etc.)  - Arrange for interpretive services to assist at discharge as needed  - Refer to Case Management Department for coordinating discharge planning if the patient needs post-hospital services based on physician/advanced practitioner order or complex needs related to functional status, cognitive ability, or social support system  Outcome: Adequate for Discharge     Problem: Knowledge Deficit  Goal: Patient/family/caregiver demonstrates understanding of disease process, treatment plan, medications, and discharge instructions  Description: Complete learning assessment and assess knowledge base.  Interventions:  - Provide teaching at level of understanding  - Provide teaching via preferred learning methods  Outcome: Adequate for Discharge     Problem: GASTROINTESTINAL - ADULT  Goal: Maintains or returns to baseline bowel function  Description: INTERVENTIONS:  - Assess bowel function  - Encourage oral fluids to ensure adequate hydration  - Administer IV fluids if ordered to ensure adequate hydration  - Administer ordered medications as needed  - Encourage mobilization and activity  - Consider nutritional services referral to assist patient with adequate nutrition and appropriate food choices  Outcome: Adequate for Discharge  Goal: Maintains adequate nutritional intake  Description: INTERVENTIONS:  - Monitor percentage of each meal consumed  - Identify factors contributing to decreased intake, treat as appropriate  - Assist with meals as needed  - Monitor I&O, weight, and lab values if indicated  - Obtain nutrition services referral as needed  Outcome: Adequate for Discharge     Problem: Nutrition/Hydration-ADULT  Goal: Nutrient/Hydration intake appropriate for improving, restoring or maintaining nutritional needs  Description: Monitor  and assess patient's nutrition/hydration status for malnutrition. Collaborate with interdisciplinary team and initiate plan and interventions as ordered.  Monitor patient's weight and dietary intake as ordered or per policy. Utilize nutrition screening tool and intervene as necessary. Determine patient's food preferences and provide high-protein, high-caloric foods as appropriate.     INTERVENTIONS:  - Monitor oral intake, urinary output, labs, and treatment plans  - Assess nutrition and hydration status and recommend course of action  - Evaluate amount of meals eaten  - Assist patient with eating if necessary   - Allow adequate time for meals  - Recommend/ encourage appropriate diets, oral nutritional supplements, and vitamin/mineral supplements  - Order, calculate, and assess calorie counts as needed  - Recommend, monitor, and adjust tube feedings and TPN/PPN based on assessed needs  - Assess need for intravenous fluids  - Provide specific nutrition/hydration education as appropriate  - Include patient/family/caregiver in decisions related to nutrition  Outcome: Adequate for Discharge

## 2024-04-07 NOTE — DISCHARGE INSTR - AVS FIRST PAGE
Diverticulitis Discharge Instructions  You have been prescribed antibiotics. Complete the full course of antibiotics prescribed to you.  You will need to be on a diverticulitis diet (low residual/low fiber) for at least 2 weeks, then you can add higher fiber foods to transition to a diverticulosis diet (high fiber)  You will need to follow up with Gastroenterology (GI) for a colonoscopy in 6-8 weeks. You should not have a colonoscopy with an active diverticulitis infection. This will be discussed at your follow up appointment with general surgery.  Call to make and appointment to follow up in the surgery office with Dr. Sutton in 2 weeks: [phone]      Diverticulitis Diet (low residual/low fiber)    WHAT YOU NEED TO KNOW:   A diverticulitis diet includes foods that allow your intestines to rest while you have diverticulitis. Diverticulitis is a condition that causes small pockets along your intestine called diverticula to become inflamed or infected. This is caused by hard bowel movement, food, or bacteria that get stuck in the pockets.  DISCHARGE INSTRUCTIONS:   Foods you can eat while you have diverticulitis:   A clear liquid diet may be recommended for 2 to 3 days. A clear liquid diet is made up of clear liquids and foods that are liquid at room temperature. Your healthcare provider will tell you when you can start eating solid foods. Examples of clear liquids include the following:      Water and clear juices (such as apple, cranberry, or grape), strained citrus juices or fruit punch     Coffee or tea (without cream or milk)     Clear sports drinks or soft drinks, such as ginger ale, lemon-lime soda, or club soda (no cola or root beer)     Clear broth, bouillon, or consommé     Plain popsicles (no popsicles with pureed fruit or fiber)     Flavored gelatin without fruit     A low-fiber diet may be recommended until your symptoms improve. Your healthcare provider will tell you when you can slowly add  high-fiber foods back into your diet.     Cream of wheat and finely ground grits     White bread, white pasta, and white rice     Canned and well-cooked fruit without skins or seeds, and juice without pulp     Canned and well-cooked vegetables without skins or seeds, and vegetable juice     Cow's milk, lactose-free milk, soy milk, and rice milk     Yogurt, cottage cheese, and sherbet     Eggs, poultry (such as chicken and turkey), fish, and tender, ground, well-cooked beef      Tofu and smooth nut butters, such as peanut butter     Broth and strained soups made of low-fiber foods  Foods you should avoid while you have diverticulitis: Avoid foods that are high in fiber while you have symptoms of diverticulitis. Examples of high-fiber foods include the following:  Whole grains and breads, and cereals made with whole grains     Dried fruit, fresh fruit with skin, and fruit pulp     Raw vegetables     Cooked greens, such as spinach     Tough meat and meat with gristle     Legumes, such as silva beans and lentils  Contact your healthcare provider if:   Your symptoms do not get better, or they get worse.      You have questions about the foods you should eat.     You have questions or concerns about your condition or care.      Diverticulosis Diet (High fiber)    WHAT YOU NEED TO KNOW:   What is a diverticulosis diet? A diverticulosis diet includes high-fiber foods. High-fiber foods help you have regular bowel movements. Extra fiber may decrease your risk of forming new diverticula (small pockets) in your intestine. A high-fiber diet may also help prevent diverticulitis. Diverticulitis is a painful condition that occurs when diverticula become inflamed or infected. You do not need to avoid nuts, seeds, corn, or popcorn while you are on a diverticulosis diet.  How much fiber do I need? You may need 25 to 35 grams of fiber each day. Ask your dietitian or healthcare provider how much fiber you should have. Increase your  intake of fiber slowly. When you eat more fiber, you may have gas and feel bloated. You may need to take a fiber supplement if you do not get enough fiber from food. Drink plenty of liquids as you increase the fiber in your diet. Your dietitian or healthcare provider may recommend 8 eight-ounce cups or more each day. Ask which liquids are best for you.   Which foods are high in fiber?   Foods with at least 4 grams of fiber per serving:      ? to ½ cup of high-fiber cereal (check the nutrition label on the box)     ½ cup of blackberries or raspberries     4 dried prunes     1 cooked artichoke     ½ cup of cooked legumes, such as lentils, or red, kidney, and silva beans     Foods with 1 to 3 grams of fiber per servin slice of whole-wheat, pumpernickel, or rye bread     4 whole-wheat crackers     ½ cup of cereal with 1 to 3 grams of fiber per serving (check the nutrition label on the box)     1 piece of fruit, such as an apple, banana, pear, kiwi, or orange     3 dates     ½ cup of canned apricots, fruit cocktail, peaches, or pears     ½ cup of raw or cooked vegetables, such as carrots, cauliflower, cabbage, spinach, squash, or corn  When should I contact my healthcare provider?   You have questions about a high-fiber diet.     You have a change in your bowel movements.     You have an upset stomach.      You have a fever.     You have pain in your lower abdomen on the left side.     You have questions about your condition or care.    CARE AGREEMENT:   You have the right to help plan your care. Learn about your health condition and how it may be treated. Discuss treatment options with your caregivers to decide what care you want to receive. You always have the right to refuse treatment. The above information is an  only. It is not intended as medical advice for individual conditions or treatments. Talk to your doctor, nurse or pharmacist before following any medical regimen to see if it is safe  and effective for you.              Diverticulitis   WHAT YOU NEED TO KNOW:   Diverticulitis is a condition that causes small pockets along your intestine called diverticula to become inflamed or infected. This is caused by hard bowel movements, food, or bacteria that get stuck in the pockets.        DISCHARGE INSTRUCTIONS:   Seek care immediately if:   You have bowel movement or foul-smelling discharge leaking from your vagina or in your urine.     You have severe diarrhea.     You urinate less than usual or not at all.     You are not able to have a bowel movement.     You cannot stop vomiting.      You have severe abdominal pain, a fever, and your abdomen is larger than usual.      You have new or increased blood in your bowel movements.  Contact your healthcare provider if:   You have pain when you urinate.     Your symptoms get worse or do not go away.      You have questions or concerns about your condition or care.  Medicines:   Antibiotics may be given to help prevent or treat a bacterial infection.     Prescription pain medicine may be given. Ask your healthcare provider how to take this medicine safely. Some prescription pain medicines contain acetaminophen. Do not take other medicines that contain acetaminophen without talking to your healthcare provider. Too much acetaminophen may cause liver damage. Prescription pain medicine may cause constipation. Ask your healthcare provider how to prevent or treat constipation.      Take your medicine as directed. Contact your healthcare provider if you think your medicine is not helping or if you have side effects. Tell him or her if you are allergic to any medicine. Keep a list of the medicines, vitamins, and herbs you take. Include the amounts, and when and why you take them. Bring the list or the pill bottles to follow-up visits. Carry your medicine list with you in case of an emergency.  Clear liquid diet: A clear liquid diet includes any liquids that you can  see through. Examples include water, ginger-devon, cranberry or apple juice, frozen fruit ice, or broth. Stay on a clear liquid diet until your symptoms are gone, or as directed.  Follow up with your healthcare provider as directed: You may need to return for a colonoscopy. When your symptoms are gone, you may need a low-fat, high-fiber diet to help prevent diverticulitis from developing again. Your healthcare provider or dietitian can help you create meal plans. Write down your questions so you remember to ask them during your visits.   © 2017 [employer] Information is for End User's use only and may not be sold, redistributed or otherwise used for commercial purposes. All illustrations and images included in CareNotes® are the copyrighted property of [employer] or [employer] ([employer]  The above information is an  only. It is not intended as medical advice for individual conditions or treatments. Talk to your doctor, nurse or pharmacist before following any medical regimen to see if it is safe and effective for you.

## 2024-04-07 NOTE — DISCHARGE SUMMARY
Discharge Date: Discharge Summary - Melanie Langford 38 y.o. male MRN: [id number]    Unit/Bed#: -02 Encounter: [id number]    Admission Date: 4/3/2024   Discharge Date: 04/08/2024    Admitting Diagnosis:   Diverticulitis [K57.92]  Abdominal pain [R10.9]  Sepsis (HCC) [A41.9]    Principle/ Secondary Diagnosis:  Past Medical History:   Diagnosis Date    Acute diverticulitis 04/07/2024    Anxiety      History reviewed. No pertinent surgical history.    Discharge Diagnoses:   Principal Problem:    Acute diverticulitis    Procedures Performed:  Orders Placed This Encounter   Procedures    ED ECG Documentation Only     Consultants:   None    HPI:Melanie Langford is a 38 y.o. male who presented to the hospital with acute diverticulitis of the splenic flexure and proximal descending colon with localized perforation with no drainable organized rim-enhancing fluid collection or abscess. Labs were remarkable for leukocytosis of 26.37 and patient was tachycardia with a heart rate of 112 on admission meeting sepsis criteria. Given findings, the patient was deemed appropriate for admission to the hospital.    Summary of Hospital Course: The patient was admitted to the hospital and started on conservative therapy with NPO/IVF and IV Zosyn.  Patient slowly improved over the course of the hospital period, diet was advanced in concordance with improvement of abdominal symptoms and return of bowel function. Leukocytosis resolved and vitals improved during this time. Diet was advanced to solid low fiber low residue diet on HOD4 which patient tolerated without nausea or vomiting. Antibiotics were transitioned from IV Zosyn to PO Augmentin which patient was able to tolerate. On HOD4 patient was doing well, tolerating solid diet without abdominal pain, nausea, or vomiting. Labs were within normal limits, and patient was afebrile. The patient was deemed appropriate for discharge home with instructions to follow up in the office in two weeks.  The patient was given a prescription to complete a total of two week course of antibiotics and was instructed to remain on a low-fiber low-residue diet for another two weeks before transitioning to a high fiber diet. All questions and concerns were answered prior to discharge.  He will follow-up in the surgery office in 2 weeks.  He will need to have a colonoscopy in 6 to 8 weeks.    Patient was seen and examined on day of discharge  /92   Pulse 78   Temp 98.3 °F (36.8 °C)   Resp 17   Ht 6' (1.829 m)   Wt 106 kg (233 lb 14.5 oz)   SpO2 96%   BMI 31.72 kg/m²   Physical Exam  Vitals reviewed.   Constitutional:       General: He is not in acute distress.     Appearance: Normal appearance. He is not ill-appearing.   HENT:      Head: Normocephalic.      Nose: Nose normal. No congestion.      Mouth/Throat:      Mouth: Mucous membranes are moist.      Pharynx: No oropharyngeal exudate.   Eyes:      General: No scleral icterus.     Extraocular Movements: Extraocular movements intact.      Conjunctiva/sclera: Conjunctivae normal.   Cardiovascular:      Rate and Rhythm: Normal rate and regular rhythm.      Pulses: Normal pulses.      Heart sounds: Normal heart sounds. No murmur heard.  Pulmonary:      Effort: Pulmonary effort is normal. No respiratory distress.      Breath sounds: Normal breath sounds. No wheezing.   Abdominal:      General: Bowel sounds are normal. There is no distension.      Palpations: Abdomen is soft.      Tenderness: There is no abdominal tenderness. There is no guarding.   Musculoskeletal:         General: No swelling or tenderness. Normal range of motion.      Cervical back: Normal range of motion and neck supple. No tenderness.      Right lower leg: No edema.      Left lower leg: No edema.   Skin:     General: Skin is warm and dry.      Capillary Refill: Capillary refill takes less than 2 seconds.      Coloration: Skin is not jaundiced or pale.      Findings: No lesion.   Neurological:       General: No focal deficit present.      Mental Status: He is alert and oriented to person, place, and time.   Psychiatric:         Mood and Affect: Mood normal.         Behavior: Behavior normal.         Significant Findings, Care, Treatment and Services Provided: See Above  XR chest portable  Result Date: 4/4/2024  Impression: No acute cardiopulmonary disease. Workstation performed: [id number]     CT abdomen pelvis with contrast  Result Date: 4/3/2024  Impression: 1.  Acute perforated diverticulitis of the splenic flexure/proximal descending colon as detailed above. Recommend surgical consultation. 2.  Mild diffuse hepatic steatosis. 3.  Indeterminate 2.6 x 1.9 cm left adrenal intermediate density nodule. Consider nonemergent adrenal washout CT or chemical shift MRI for further characterization. Findings discussed with JULIAN Gay at 11:54 p.m. Workstation performed: [id number]     Complications: None    Discharge Diagnosis: Acute diverticulitis with abscess    Medical Problems       Resolved Problems  Date Reviewed: 4/3/2024   None       Principal Problem:    Acute diverticulitis      Condition at Discharge: stable       Discharge instructions/Information to patient and family:   See after visit summary for information provided to patient and family.      Provisions for Follow-Up Care:  See after visit summary for information related to follow-up care and any pertinent home health orders.      PCP: No primary care provider on file.    Disposition: See After Visit Summary for discharge disposition information.    Planned Readmission: No    Discharge Statement   I spent 30 minutes discharging the patient. This time was spent on the day of discharge. I had direct contact with the patient on the day of discharge. Additional documentation is required if more than 30 minutes were spent on discharge.     Discharge Medications:  See after visit summary for reconciled discharge medications provided to patient and  family.

## 2024-04-08 NOTE — UTILIZATION REVIEW
NOTIFICATION OF ADMISSION DISCHARGE   This is a Notification of Discharge from [facility]. Please be advised that this patient has been discharge from our facility. Below you will find the admission and discharge date and time including the patient’s disposition.   UTILIZATION REVIEW CONTACT:  Ariana Ellis  Utilization   Network Utilization Review Department  Phone: [phone] x carefully listen to the prompts. All voicemails are confidential.  Email: [email or web address]     ADMISSION INFORMATION  PRESENTATION DATE: 4/3/2024 10:00 PM  OBERVATION ADMISSION DATE:   INPATIENT ADMISSION DATE: 4/4/24 12:57 AM   DISCHARGE DATE: 4/7/2024  1:58 PM   DISPOSITION:Home/Self Care    Network Utilization Review Department  ATTENTION: Please call with any questions or concerns to [phone] and carefully listen to the prompts so that you are directed to the right person. All voicemails are confidential.   For Discharge needs, contact Care Management DC Support Team at [phone] opt. [phone]  Send all requests for admission clinical reviews, approved or denied determinations and any other requests to dedicated fax number below belonging to the campus where the patient is receiving treatment. List of dedicated fax numbers for the Facilities:  FACILITY NAME UR FAX NUMBER   ADMISSION DENIALS (Administrative/Medical Necessity) [phone]   DISCHARGE SUPPORT TEAM ([facility]) [phone]   PARENT CHILD HEALTH (Maternity/NICU/Pediatrics) [phone]   [facility] [phone]   [facility] [phone]   [facility] [phone]   [facility] [phone]   [facility] [phone]   [facility] [phone]   [facility] [phone]   [facility] [phone]    [facility] [phone]   Critical access hospital [phone]   [facility] [phone]   [facility] [phone]

## 2024-04-09 LAB
BACTERIA BLD CULT: NORMAL
BACTERIA BLD CULT: NORMAL

## 2024-04-11 ENCOUNTER — OFFICE VISIT (OUTPATIENT)
Dept: FAMILY MEDICINE CLINIC | Facility: CLINIC | Age: 39
End: 2024-04-11
Payer: COMMERCIAL

## 2024-04-11 VITALS
HEIGHT: 72 IN | BODY MASS INDEX: 30.88 KG/M2 | DIASTOLIC BLOOD PRESSURE: 98 MMHG | TEMPERATURE: 98.3 F | WEIGHT: 228 LBS | OXYGEN SATURATION: 98 % | HEART RATE: 68 BPM | SYSTOLIC BLOOD PRESSURE: 146 MMHG

## 2024-04-11 DIAGNOSIS — K57.92 ACUTE DIVERTICULITIS: ICD-10-CM

## 2024-04-11 DIAGNOSIS — Z76.89 ENCOUNTER TO ESTABLISH CARE: ICD-10-CM

## 2024-04-11 DIAGNOSIS — R03.0 ELEVATED BP WITHOUT DIAGNOSIS OF HYPERTENSION: ICD-10-CM

## 2024-04-11 DIAGNOSIS — Z00.00 ROUTINE PHYSICAL EXAMINATION: Primary | ICD-10-CM

## 2024-04-11 PROCEDURE — 99385 PREV VISIT NEW AGE 18-39: CPT

## 2024-04-11 NOTE — PROGRESS NOTES
ADULT ANNUAL PHYSICAL  [facility]ACT[facility]    NAME: Melanie Langford  AGE: 38 y.o. SEX: male  : 1985     DATE: 2024     Assessment and Plan:     Problem List Items Addressed This Visit       Acute diverticulitis     Other Visit Diagnoses       Routine physical examination    -  Primary    Elevated BP without diagnosis of hypertension        Encounter to establish care            Immunizations and preventive care screenings were discussed with patient today. Appropriate education was printed on patient's after visit summary.  Patient presents to [facility] and for annual physical.   No acute complaints.   UTD on most routine labs; defers lipid panel.   Physical exam unremarkable.     Elevated BP:   - Patient's BP elevated today at 146/98 mmHg; on review was elevated when he was in the hospital as well  - advised to get a home BP cuff and start taking at home and keep a log of it  - follow up in four weeks; if consistently above goal (140/90 mmHg) will need to discuss starting BP medication at that time  - to call with any questions/concerns     Diverticulitis  - currently on outpatient Augmentin; discharged from hospital on 10 day course   - symptoms have resolved completely; feeling back to normal   - scheduling a follow up with gen surg next week  - scheduling colonoscopy with GI within the next two months per gen surg instructions; declines needed a referral at this time   - return ER precautions given     Follow up in four weeks or sooner as needed.     Counseling:  Dental Health: discussed importance of regular tooth brushing, flossing, and dental visits.  Exercise: the importance of regular exercise/physical activity was discussed. Recommend exercise 3-5 times per week for at least 30 minutes.      Return in about 4 weeks (around 2024).     Chief Complaint:     Chief Complaint   Patient presents with    [facility]    Follow-up     From  hospital       History of Present Illness:     Adult Annual Physical   Patient here for a comprehensive physical exam. The patient reports no problems.  Patient here to establish care.   Recently hospitalized from 4/3-4/7/24 for acute diverticulitis which caused sepsis. Patient was treated with conservative therapy with NPO/IVF and IV Zosyn which was transitioned to PO Augmentin on discharge. Patient to follow up with general surgery outpatient in 1-2 weeks and get colonoscopy in about six weeks.   Patient doing well since discharge; notes feeling back to normal. Having normal bowel movements and feeling himself.     Diet and Physical Activity  Diet/Nutrition: well balanced diet and adequate fiber intake.   Exercise: walking and moderate cardiovascular exercise.      Depression Screening  PHQ-2/9 Depression Screening    Little interest or pleasure in doing things: 0 - not at all  Feeling down, depressed, or hopeless: 0 - not at all  PHQ-2 Score: 0  PHQ-2 Interpretation: Negative depression screen       General Health  Sleep: sleeps well.   Hearing: normal - bilateral.  Vision: no vision problems.   Dental: no dental visits for >1 year and brushes teeth twice daily.        Health  History of STDs?: no.   Review of Systems:     Review of Systems   Constitutional:  Negative for appetite change, chills, diaphoresis, fatigue and fever.   Eyes:  Negative for visual disturbance.   Respiratory:  Negative for cough, choking, chest tightness, shortness of breath and stridor.    Cardiovascular:  Negative for chest pain and palpitations.   Gastrointestinal:  Negative for abdominal pain, blood in stool, constipation, diarrhea, nausea and vomiting.   Genitourinary:  Negative for difficulty urinating.   Neurological:  Negative for dizziness, light-headedness and headaches.      Past Medical History:     Past Medical History:   Diagnosis Date    Acute diverticulitis 04/07/2024    Anxiety       Past Surgical History:     No past  surgical history on file.   Social History:     Social History     Socioeconomic History    Marital status: /Civil Union     Spouse name: None    Number of children: None    Years of education: None    Highest education level: None   Occupational History    None   Tobacco Use    Smoking status: Former     Current packs/day: 0.00     Average packs/day: 0.3 packs/day for 8.0 years (2.0 ttl pk-yrs)     Types: Cigarettes     Quit date: 9/3/2022     Years since quittin.6    Smokeless tobacco: Never   Vaping Use    Vaping status: Never Used   Substance and Sexual Activity    Alcohol use: Yes     Comment: rarely    Drug use: Not Currently    Sexual activity: Yes   Other Topics Concern    None   Social History Narrative    None     Social Determinants of Health     Financial Resource Strain: Not on file   Food Insecurity: No Food Insecurity (2024)    Hunger Vital Sign     Worried About Running Out of Food in the Last Year: Never true     Ran Out of Food in the Last Year: Never true   Transportation Needs: No Transportation Needs (2024)    PRAPARE - Transportation     Lack of Transportation (Medical): No     Lack of Transportation (Non-Medical): No   Physical Activity: Not on file   Stress: Not on file   Social Connections: Not on file   Intimate Partner Violence: Not on file   Housing Stability: Low Risk  (2024)    Housing Stability Vital Sign     Unable to Pay for Housing in the Last Year: No     Number of Places Lived in the Last Year: 1     Unstable Housing in the Last Year: No      Family History:     Family History   Problem Relation Age of Onset    Hypertension Father       Current Medications:     Current Outpatient Medications   Medication Sig Dispense Refill    amoxicillin-clavulanate (AUGMENTIN) 875-125 mg per tablet Take 1 tablet by mouth every 12 (twelve) hours for 10 days 20 tablet 0     No current facility-administered medications for this visit.      Allergies:     No Known Allergies    Physical Exam:     /98   Pulse 68   Temp 98.3 °F (36.8 °C)   Ht 6' (1.829 m)   Wt 103 kg (228 lb)   SpO2 98%   BMI 30.92 kg/m²     Physical Exam  Vitals reviewed.   Constitutional:       General: He is not in acute distress.     Appearance: Normal appearance. He is not ill-appearing or diaphoretic.   HENT:      Head: Normocephalic and atraumatic.      Right Ear: Tympanic membrane, ear canal and external ear normal. There is no impacted cerumen.      Left Ear: Tympanic membrane, ear canal and external ear normal. There is no impacted cerumen.      Nose: Nose normal. No congestion or rhinorrhea.      Mouth/Throat:      Mouth: Mucous membranes are moist.      Pharynx: Oropharynx is clear. No oropharyngeal exudate or posterior oropharyngeal erythema.   Eyes:      General:         Right eye: No discharge.         Left eye: No discharge.      Conjunctiva/sclera: Conjunctivae normal.      Pupils: Pupils are equal, round, and reactive to light.   Cardiovascular:      Rate and Rhythm: Normal rate and regular rhythm.      Pulses: Normal pulses.      Heart sounds: Normal heart sounds. No murmur heard.  Pulmonary:      Effort: Pulmonary effort is normal. No respiratory distress.      Breath sounds: Normal breath sounds. No wheezing, rhonchi or rales.   Abdominal:      General: Bowel sounds are normal. There is no distension.      Palpations: Abdomen is soft. There is no mass.      Tenderness: There is no abdominal tenderness. There is no guarding or rebound.      Hernia: No hernia is present.   Musculoskeletal:         General: Normal range of motion.      Cervical back: Normal range of motion and neck supple.      Right lower leg: No edema.      Left lower leg: No edema.   Lymphadenopathy:      Cervical: No cervical adenopathy.   Skin:     General: Skin is warm.   Neurological:      General: No focal deficit present.      Mental Status: He is alert.      Gait: Gait normal.   Psychiatric:         Mood and Affect:  Mood normal.          Farida Croft PA-C   [facility]

## 2024-04-18 ENCOUNTER — OFFICE VISIT (OUTPATIENT)
Dept: SURGERY | Facility: CLINIC | Age: 39
End: 2024-04-18
Payer: COMMERCIAL

## 2024-04-18 VITALS
HEART RATE: 73 BPM | WEIGHT: 224 LBS | OXYGEN SATURATION: 98 % | HEIGHT: 72 IN | RESPIRATION RATE: 18 BRPM | SYSTOLIC BLOOD PRESSURE: 138 MMHG | TEMPERATURE: 97.7 F | DIASTOLIC BLOOD PRESSURE: 78 MMHG | BODY MASS INDEX: 30.34 KG/M2

## 2024-04-18 DIAGNOSIS — K57.20 DIVERTICULITIS OF COLON WITH PERFORATION: Primary | ICD-10-CM

## 2024-04-18 PROCEDURE — 99213 OFFICE O/P EST LOW 20 MIN: CPT | Performed by: SURGERY

## 2024-04-18 NOTE — PROGRESS NOTES
Follow Up - General Surgery   Melanie Langford 38 y.o. male MRN: [id number]  Unit/Bed#:  Encounter: [id number]    Assessment/Plan     Assessment:  Acute descending colon diverticulitis with contained perforation, improved  History of anxiety  Plan:  The patient is doing quite well after discharge from the hospital, 2 days 14 days since discharge, he was on low residual diet, he was advised to change diet to high-fiber.  The patient will be referred to GI for colonoscopy surveillance.  He is otherwise discharged from my care and and I will be glad to see him if any problem arises in the future.    History of Present Illness     HPI:  Melanie Langford is a 38 y.o. male who presents to my office for follow-up after hospital stay secondary to acute diverticulitis with contained perforation in the ascending colon.  The patient was treated conservatively without surgery.  He denies having any fever, chills, nausea, vomiting, diarrhea, constipation but he does have occasional discomfort on the left side of the abdomen and back.  I reviewed the CT scan report and films in the office.    Review of Systems  The rest of the review of system total of 10 were negative except for the HPI.    Historical Information   Past Medical History:   Diagnosis Date    Acute diverticulitis 2024    Anxiety      History reviewed. No pertinent surgical history.  Social History   Social History     Substance and Sexual Activity   Alcohol Use Yes    Comment: rarely     Social History     Substance and Sexual Activity   Drug Use Not Currently     Social History     Tobacco Use   Smoking Status Former    Current packs/day: 0.00    Average packs/day: 0.3 packs/day for 8.0 years (2.0 ttl pk-yrs)    Types: Cigarettes    Quit date: 9/3/2022    Years since quittin.6    Passive exposure: Past   Smokeless Tobacco Never     Family History: non-contributory    Meds/Allergies   all medications and allergies reviewed   No current outpatient medications on  file.  No Known Allergies    Objective     Current Vitals:   Blood Pressure: 138/78 (04/18/24 1132)  Pulse: 73 (04/18/24 1132)  Temperature: 97.7 °F (36.5 °C) (04/18/24 1132)  Respirations: 18 (04/18/24 1132)  Height: 6' (182.9 cm) (04/18/24 1132)  Weight - Scale: 102 kg (224 lb) (04/18/24 1132)  SpO2: 98 % (04/18/24 1132)    Physical Exam  Vitals and nursing note reviewed.   Constitutional:       General: He is not in acute distress.  Cardiovascular:      Rate and Rhythm: Normal rate and regular rhythm.   Pulmonary:      Effort: No respiratory distress.      Breath sounds: Normal breath sounds.   Abdominal:      Palpations: Abdomen is soft. There is no mass.      Tenderness: There is no abdominal tenderness.   Skin:     General: Skin is warm.      Coloration: Skin is not jaundiced.      Findings: No erythema or rash.   Neurological:      Mental Status: He is alert and oriented to person, place, and time.      Cranial Nerves: No cranial nerve deficit.   Psychiatric:         Mood and Affect: Mood normal.         Behavior: Behavior normal.     Lab Results: I have personally reviewed pertinent lab results.    Imaging: I have personally reviewed pertinent reports.   and I have personally reviewed pertinent films in PACS  Procedure: XR chest portable    Result Date: 4/4/2024  Narrative: XR CHEST PORTABLE INDICATION: Cardiac work-up. COMPARISON: None FINDINGS: Clear lungs. No pneumothorax or pleural effusion. Normal cardiomediastinal silhouette. Bones are unremarkable for age. Normal upper abdomen.     Impression: No acute cardiopulmonary disease. Workstation performed: [id number]     Procedure: CT abdomen pelvis with contrast    Result Date: 4/3/2024  Narrative: CT ABDOMEN AND PELVIS WITH IV CONTRAST INDICATION: llq pain. COMPARISON: None. TECHNIQUE: CT examination of the abdomen and pelvis was performed. Multiplanar 2D reformatted images were created from the source data. This examination, like all CT scans performed  in the [facility], was performed utilizing techniques to minimize radiation dose exposure, including the use of iterative reconstruction and automated exposure control. Radiation dose length product (DLP) for this visit: 1074 mGy-cm IV Contrast: 100 mL of iohexol (OMNIPAQUE) Enteric Contrast: Not administered. FINDINGS: ABDOMEN LOWER CHEST: Nonspecific subpleural 3 mm groundglass nodule in the right middle lobe, no routine follow-up imaging recommended per current Fleischner Society guidelines. No clinically significant abnormality in the visualized lower chest. No pleural or pericardial effusions. LIVER/BILIARY TREE: Mild diffuse hepatic steatosis. No suspicious mass. Normal hepatic contours. No biliary dilation. GALLBLADDER: No calcified gallstones. No pericholecystic inflammatory change. SPLEEN: Unremarkable. PANCREAS: Unremarkable. ADRENAL GLANDS: Right adrenal gland is unremarkable. Indeterminate 2.6 x 1.9 cm left adrenal intermediate density nodule. Consider nonemergent adrenal washout CT or chemical shift MRI for further characterization. KIDNEYS/URETERS: Unremarkable. No hydronephrosis. STOMACH AND BOWEL: Stomach is mildly distended with air and fluid. No intraluminal mass or irregular wall thickening. The small bowel loops are normal in course and caliber without obstruction or inflammation. Terminal ileum and appendix are grossly unremarkable. There is abnormal wall thickening with prominent surrounding mesenteric inflammatory stranding involving the splenic flexure and descending colon with multiple colonic diverticuli in this region seen. Abnormal inflammatory debris extends inferiorly along the retroperitoneum into the left lower quadrant and left pelvic region. Several foci of free air adjacent to the posterior wall of the proximal descending colon in the region of a large inflamed diverticula noted. No drainable organized  rim-enhancing fluid collection/abscess. Overall findings are  consistent with acute perforated diverticulitis of the distal splenic flexure and proximal descending colon. APPENDIX: Normal. ABDOMINOPELVIC CAVITY: No ascites or loculated fluid collection. There is pneumoperitoneum adjacent to the posterior wall of the proximal descending colon. No lymphadenopathy by size criteria. Multiple nonspecific subcentimeter mesenteric lymph nodes are  seen predominantly in the left abdomen and pelvis, likely reactive in etiology. VESSELS: Unremarkable for patient's age. PELVIS REPRODUCTIVE ORGANS: Unremarkable for patient's age. URINARY BLADDER: Mildly distended and grossly unremarkable. ABDOMINAL WALL/INGUINAL REGIONS: Unremarkable. BONES: No acute fracture or suspicious osseous lesion.     Impression: 1.  Acute perforated diverticulitis of the splenic flexure/proximal descending colon as detailed above. Recommend surgical consultation. 2.  Mild diffuse hepatic steatosis. 3.  Indeterminate 2.6 x 1.9 cm left adrenal intermediate density nodule. Consider nonemergent adrenal washout CT or chemical shift MRI for further characterization. Findings discussed with JULIAN Gay at 11:54 p.m. Workstation performed: [id number]

## 2024-04-18 NOTE — PATIENT INSTRUCTIONS
High Fiber Diet   WHAT YOU NEED TO KNOW:   A high-fiber diet includes foods that have a high amount of fiber. Fiber is the part of fruits, vegetables, and grains that is not broken down by your body. Fiber keeps your bowel movements regular. Fiber can also help lower your cholesterol level, control blood sugar in people with diabetes, and relieve constipation. Fiber can also help you control your weight because it helps you feel full faster. Most adults should eat 25 to 35 grams of fiber each day. Talk to your dietitian or healthcare provider about the amount of fiber you need.  DISCHARGE INSTRUCTIONS:   Good sources of fiber:       Foods with at least 4 grams of fiber per serving:      ? to ½ cup of high-fiber cereal (check the nutrition label on the box)    ½ cup of blackberries or raspberries    4 dried prunes    1 cooked artichoke    ½ cup of cooked legumes, such as lentils, or red, kidney, and silva beans    Foods with 1 to 3 grams of fiber per servin slice of whole-wheat, pumpernickel, or rye bread    ½ cup of cooked brown rice    4 whole-wheat crackers    1 cup of oatmeal    ½ cup of cereal with 1 to 3 grams of fiber per serving (check the nutrition label on the box)    1 small piece of fruit, such as an apple, banana, pear, kiwi, or orange    3 dates    ½ cup of canned apricots, fruit cocktail, peaches, or pears    ½ cup of raw or cooked vegetables, such as carrots, cauliflower, cabbage, spinach, squash, or corn  Ways that you can increase fiber in your diet:   Choose brown or wild rice instead of white rice.     Use whole wheat flour in recipes instead of white or all-purpose flour.     Add beans and peas to casseroles or soups.     Choose fresh fruit and vegetables with peels or skins on instead of juices.    Other diet guidelines to follow:   Add fiber to your diet slowly.  You may have abdominal discomfort, bloating, and gas if you add fiber to your diet too quickly.     Drink plenty of liquids  as you add fiber to your diet.  You may have nausea or develop constipation if you do not drink enough water. Ask how much liquid to drink each day and which liquids are best for you.    © Copyright [employer] 2023 Information is for End User's use only and may not be sold, redistributed or otherwise used for commercial purposes.  The above information is an  only. It is not intended as medical advice for individual conditions or treatments. Talk to your doctor, nurse or pharmacist before following any medical regimen to see if it is safe and effective for you.

## 2024-04-19 ENCOUNTER — APPOINTMENT (EMERGENCY)
Dept: CT IMAGING | Facility: HOSPITAL | Age: 39
DRG: 244 | End: 2024-04-19
Payer: COMMERCIAL

## 2024-04-19 ENCOUNTER — HOSPITAL ENCOUNTER (INPATIENT)
Facility: HOSPITAL | Age: 39
LOS: 2 days | Discharge: HOME | DRG: 244 | End: 2024-04-21
Attending: EMERGENCY MEDICINE | Admitting: SURGERY
Payer: COMMERCIAL

## 2024-04-19 DIAGNOSIS — K57.80 DIVERTICULITIS OF INTESTINE WITH ABSCESS: Primary | ICD-10-CM

## 2024-04-19 LAB
ALBUMIN SERPL BCP-MCNC: 4.3 G/DL (ref 3.5–5)
ALP SERPL-CCNC: 89 U/L (ref 34–104)
ALT SERPL W P-5'-P-CCNC: 26 U/L (ref 7–52)
ANION GAP SERPL CALCULATED.3IONS-SCNC: 10 MMOL/L (ref 4–13)
AST SERPL W P-5'-P-CCNC: 22 U/L (ref 13–39)
BASOPHILS # BLD AUTO: 0.08 THOUSANDS/ÂΜL (ref 0–0.1)
BASOPHILS NFR BLD AUTO: 0 % (ref 0–1)
BILIRUB SERPL-MCNC: 0.59 MG/DL (ref 0.2–1)
BILIRUB UR QL STRIP: NEGATIVE
BUN SERPL-MCNC: 8 MG/DL (ref 5–25)
CALCIUM SERPL-MCNC: 9.4 MG/DL (ref 8.4–10.2)
CHLORIDE SERPL-SCNC: 102 MMOL/L (ref 96–108)
CLARITY UR: CLEAR
CO2 SERPL-SCNC: 25 MMOL/L (ref 21–32)
COLOR UR: COLORLESS
CREAT SERPL-MCNC: 0.9 MG/DL (ref 0.6–1.3)
EOSINOPHIL # BLD AUTO: 0.06 THOUSAND/ÂΜL (ref 0–0.61)
EOSINOPHIL NFR BLD AUTO: 0 % (ref 0–6)
ERYTHROCYTE [DISTWIDTH] IN BLOOD BY AUTOMATED COUNT: 12.2 % (ref 11.6–15.1)
FLUAV RNA RESP QL NAA+PROBE: NEGATIVE
FLUBV RNA RESP QL NAA+PROBE: NEGATIVE
GFR SERPL CREATININE-BSD FRML MDRD: 109 ML/MIN/1.73SQ M
GLUCOSE SERPL-MCNC: 97 MG/DL (ref 65–140)
GLUCOSE UR STRIP-MCNC: NEGATIVE MG/DL
HCT VFR BLD AUTO: 42.9 % (ref 36.5–49.3)
HGB BLD-MCNC: 13.9 G/DL (ref 12–17)
HGB UR QL STRIP.AUTO: NEGATIVE
IMM GRANULOCYTES # BLD AUTO: 0.09 THOUSAND/UL (ref 0–0.2)
IMM GRANULOCYTES NFR BLD AUTO: 1 % (ref 0–2)
KETONES UR STRIP-MCNC: NEGATIVE MG/DL
LACTATE SERPL-SCNC: 1.5 MMOL/L (ref 0.5–2)
LEUKOCYTE ESTERASE UR QL STRIP: NEGATIVE
LYMPHOCYTES # BLD AUTO: 2.01 THOUSANDS/ÂΜL (ref 0.6–4.47)
LYMPHOCYTES NFR BLD AUTO: 11 % (ref 14–44)
MCH RBC QN AUTO: 28.9 PG (ref 26.8–34.3)
MCHC RBC AUTO-ENTMCNC: 32.4 G/DL (ref 31.4–37.4)
MCV RBC AUTO: 89 FL (ref 82–98)
MONOCYTES # BLD AUTO: 1.18 THOUSAND/ÂΜL (ref 0.17–1.22)
MONOCYTES NFR BLD AUTO: 6 % (ref 4–12)
NEUTROPHILS # BLD AUTO: 15.63 THOUSANDS/ÂΜL (ref 1.85–7.62)
NEUTS SEG NFR BLD AUTO: 82 % (ref 43–75)
NITRITE UR QL STRIP: NEGATIVE
NRBC BLD AUTO-RTO: 0 /100 WBCS
PH UR STRIP.AUTO: 6 [PH]
PLATELET # BLD AUTO: 423 THOUSANDS/UL (ref 149–390)
PLATELET # BLD AUTO: 440 THOUSANDS/UL (ref 149–390)
PMV BLD AUTO: 10.1 FL (ref 8.9–12.7)
PMV BLD AUTO: 9.9 FL (ref 8.9–12.7)
POTASSIUM SERPL-SCNC: 4.3 MMOL/L (ref 3.5–5.3)
PROT SERPL-MCNC: 8.3 G/DL (ref 6.4–8.4)
PROT UR STRIP-MCNC: NEGATIVE MG/DL
RBC # BLD AUTO: 4.81 MILLION/UL (ref 3.88–5.62)
RSV RNA RESP QL NAA+PROBE: NEGATIVE
SARS-COV-2 RNA RESP QL NAA+PROBE: NEGATIVE
SODIUM SERPL-SCNC: 137 MMOL/L (ref 135–147)
SP GR UR STRIP.AUTO: 1 (ref 1–1.03)
UROBILINOGEN UR STRIP-ACNC: <2 MG/DL
WBC # BLD AUTO: 19.05 THOUSAND/UL (ref 4.31–10.16)

## 2024-04-19 PROCEDURE — 0241U HB NFCT DS VIR RESP RNA 4 TRGT: CPT | Performed by: PHYSICIAN ASSISTANT

## 2024-04-19 PROCEDURE — 85049 AUTOMATED PLATELET COUNT: CPT | Performed by: SURGERY

## 2024-04-19 PROCEDURE — 83605 ASSAY OF LACTIC ACID: CPT | Performed by: PHYSICIAN ASSISTANT

## 2024-04-19 PROCEDURE — 87040 BLOOD CULTURE FOR BACTERIA: CPT | Performed by: PHYSICIAN ASSISTANT

## 2024-04-19 PROCEDURE — 85025 COMPLETE CBC W/AUTO DIFF WBC: CPT | Performed by: PHYSICIAN ASSISTANT

## 2024-04-19 PROCEDURE — 81003 URINALYSIS AUTO W/O SCOPE: CPT | Performed by: PHYSICIAN ASSISTANT

## 2024-04-19 PROCEDURE — 99284 EMERGENCY DEPT VISIT MOD MDM: CPT

## 2024-04-19 PROCEDURE — 74177 CT ABD & PELVIS W/CONTRAST: CPT

## 2024-04-19 PROCEDURE — 36415 COLL VENOUS BLD VENIPUNCTURE: CPT | Performed by: PHYSICIAN ASSISTANT

## 2024-04-19 PROCEDURE — 80053 COMPREHEN METABOLIC PANEL: CPT | Performed by: PHYSICIAN ASSISTANT

## 2024-04-19 RX ORDER — METRONIDAZOLE 500 MG/100ML
500 INJECTION, SOLUTION INTRAVENOUS EVERY 8 HOURS
Status: DISCONTINUED | OUTPATIENT
Start: 2024-04-19 | End: 2024-04-21 | Stop reason: HOSPADM

## 2024-04-19 RX ORDER — SODIUM CHLORIDE, SODIUM LACTATE, POTASSIUM CHLORIDE, CALCIUM CHLORIDE 600; 310; 30; 20 MG/100ML; MG/100ML; MG/100ML; MG/100ML
125 INJECTION, SOLUTION INTRAVENOUS CONTINUOUS
Status: DISCONTINUED | OUTPATIENT
Start: 2024-04-19 | End: 2024-04-21 | Stop reason: HOSPADM

## 2024-04-19 RX ORDER — HEPARIN SODIUM 5000 [USP'U]/ML
5000 INJECTION, SOLUTION INTRAVENOUS; SUBCUTANEOUS EVERY 8 HOURS SCHEDULED
Status: DISCONTINUED | OUTPATIENT
Start: 2024-04-19 | End: 2024-04-21 | Stop reason: HOSPADM

## 2024-04-19 RX ORDER — ACETAMINOPHEN 325 MG/1
650 TABLET ORAL EVERY 6 HOURS PRN
Status: DISCONTINUED | OUTPATIENT
Start: 2024-04-19 | End: 2024-04-21 | Stop reason: HOSPADM

## 2024-04-19 RX ORDER — ONDANSETRON 2 MG/ML
4 INJECTION INTRAMUSCULAR; INTRAVENOUS EVERY 6 HOURS PRN
Status: DISCONTINUED | OUTPATIENT
Start: 2024-04-19 | End: 2024-04-21 | Stop reason: HOSPADM

## 2024-04-19 RX ORDER — OXYCODONE HYDROCHLORIDE 5 MG/1
5 TABLET ORAL EVERY 4 HOURS PRN
Status: DISCONTINUED | OUTPATIENT
Start: 2024-04-19 | End: 2024-04-21 | Stop reason: HOSPADM

## 2024-04-19 RX ADMIN — PIPERACILLIN AND TAZOBACTAM 4.5 G: 36; 4.5 INJECTION, POWDER, FOR SOLUTION INTRAVENOUS at 20:49

## 2024-04-19 RX ADMIN — SODIUM CHLORIDE, SODIUM LACTATE, POTASSIUM CHLORIDE, AND CALCIUM CHLORIDE 125 ML/HR: .6; .31; .03; .02 INJECTION, SOLUTION INTRAVENOUS at 23:23

## 2024-04-19 RX ADMIN — IOHEXOL 100 ML: 350 INJECTION, SOLUTION INTRAVENOUS at 19:51

## 2024-04-19 RX ADMIN — METRONIDAZOLE 500 MG: 500 INJECTION, SOLUTION INTRAVENOUS at 23:22

## 2024-04-19 RX ADMIN — SODIUM CHLORIDE 1000 ML: 0.9 INJECTION, SOLUTION INTRAVENOUS at 20:50

## 2024-04-19 NOTE — ED PROVIDER NOTES
History  Chief Complaint   Patient presents with    Fever     Patient arrived to ER c/o fever at home was 99.8 that was noticed this afternoon. Patient states he was here 2 weeks ago for diverticulitis with bowel perforation and doctors said to come back if any signs of an infection.      38-year-old male with fever.  Started today.  States it was low-grade, 99.8 at home.  Has been taking Tylenol throughout the day 2.  He was recently admitted for perforated diverticulitis.  Completed a 10-day antibiotic course including IV and oral antibiotics.  He was actually consistently seen by his surgeon yesterday and had overall been feeling well.  He continues to have a soreness to his lower abdomen but is unchanged today.  Denies any diarrhea.  No cough congestion or sore throat.  No shortness of breath.  His son is sick with an ear infection and multiple of his sons friends that he was recently around have also been sick with URI symptoms.  He thought he might just have some sort of viral illness however given his recent history when he contacted his surgeon they recommended ER evaluation.      History provided by:  Patient   used: No    Fever  Associated symptoms: abdominal pain and fever    Associated symptoms: no chest pain, no cough, no ear pain, no rash, no shortness of breath, no sore throat and no vomiting        None       Past Medical History:   Diagnosis Date    Acute diverticulitis 04/07/2024    Anxiety        History reviewed. No pertinent surgical history.    Family History   Problem Relation Age of Onset    Hypertension Father      I have reviewed and agree with the history as documented.    E-Cigarette/Vaping    E-Cigarette Use Never User      E-Cigarette/Vaping Substances    Nicotine No     THC No     CBD No     Flavoring No     Other No     Unknown No      Social History     Tobacco Use    Smoking status: Former     Current packs/day: 0.00     Average packs/day: 0.3 packs/day for 8.0  years (2.0 ttl pk-yrs)     Types: Cigarettes     Quit date: 9/3/2022     Years since quittin.6     Passive exposure: Past    Smokeless tobacco: Never   Vaping Use    Vaping status: Never Used   Substance Use Topics    Alcohol use: Yes     Comment: rarely    Drug use: Not Currently       Review of Systems   Constitutional:  Positive for fever. Negative for chills.   HENT:  Negative for ear pain and sore throat.    Eyes:  Negative for pain and visual disturbance.   Respiratory:  Negative for cough and shortness of breath.    Cardiovascular:  Negative for chest pain and palpitations.   Gastrointestinal:  Positive for abdominal pain. Negative for vomiting.   Genitourinary:  Negative for dysuria and hematuria.   Musculoskeletal:  Negative for arthralgias and back pain.   Skin:  Negative for color change and rash.   Neurological:  Negative for seizures and syncope.   All other systems reviewed and are negative.      Physical Exam  Physical Exam  Vitals and nursing note reviewed.   Constitutional:       General: He is not in acute distress.     Appearance: He is well-developed.   HENT:      Head: Normocephalic and atraumatic.   Eyes:      Conjunctiva/sclera: Conjunctivae normal.   Cardiovascular:      Rate and Rhythm: Normal rate and regular rhythm.      Heart sounds: No murmur heard.  Pulmonary:      Effort: Pulmonary effort is normal. No respiratory distress.      Breath sounds: Normal breath sounds.   Abdominal:      Palpations: Abdomen is soft.      Tenderness: There is no abdominal tenderness.      Comments: Abdomen soft nontender nondistended   Musculoskeletal:         General: No swelling.      Cervical back: Neck supple.   Skin:     General: Skin is warm and dry.      Capillary Refill: Capillary refill takes less than 2 seconds.   Neurological:      Mental Status: He is alert.   Psychiatric:         Mood and Affect: Mood normal.         Vital Signs  ED Triage Vitals [24 1750]   Temperature Pulse  Respirations Blood Pressure SpO2   99.6 °F (37.6 °C) 98 18 138/83 100 %      Temp Source Heart Rate Source Patient Position - Orthostatic VS BP Location FiO2 (%)   Oral Monitor -- -- --      Pain Score       --           Vitals:    04/19/24 1750   BP: 138/83   Pulse: 98         Visual Acuity      ED Medications  Medications - No data to display    Diagnostic Studies  Results Reviewed       None                   No orders to display              Procedures  Procedures         ED Course                               SBIRT 20yo+      Flowsheet Row Most Recent Value   Initial Alcohol Screen: US AUDIT-C     1. How often do you have a drink containing alcohol? 0 Filed at: 04/19/2024 1753   2. How many drinks containing alcohol do you have on a typical day you are drinking?  0 Filed at: 04/19/2024 1753   3a. Male UNDER 65: How often do you have five or more drinks on one occasion? 0 Filed at: 04/19/2024 1753   Audit-C Score 0 Filed at: 04/19/2024 1753   CLIFFORD: How many times in the past year have you...    Used an illegal drug or used a prescription medication for non-medical reasons? Never Filed at: 04/19/2024 1753                      Medical Decision Making  Differential diagnosis includes but is not limited to viral syndrome, UTI, enteritis, gastroenteritis, gastritis, diverticulitis.  Plan: CT abdomen pelvis.  Labs.  COVID flu RSV swab. Dispo pending.             Disposition  Final diagnoses:   None     ED Disposition       None          Follow-up Information    None         Patient's Medications    No medications on file       No discharge procedures on file.    PDMP Review       None            ED Provider  Electronically Signed by

## 2024-04-20 ENCOUNTER — APPOINTMENT (INPATIENT)
Dept: CT IMAGING | Facility: HOSPITAL | Age: 39
DRG: 244 | End: 2024-04-20
Attending: STUDENT IN AN ORGANIZED HEALTH CARE EDUCATION/TRAINING PROGRAM
Payer: COMMERCIAL

## 2024-04-20 LAB
ANION GAP SERPL CALCULATED.3IONS-SCNC: 9 MMOL/L (ref 4–13)
BASOPHILS # BLD AUTO: 0.08 THOUSANDS/ÂΜL (ref 0–0.1)
BASOPHILS NFR BLD AUTO: 1 % (ref 0–1)
BUN SERPL-MCNC: 8 MG/DL (ref 5–25)
CALCIUM SERPL-MCNC: 9 MG/DL (ref 8.4–10.2)
CHLORIDE SERPL-SCNC: 104 MMOL/L (ref 96–108)
CO2 SERPL-SCNC: 24 MMOL/L (ref 21–32)
CREAT SERPL-MCNC: 0.84 MG/DL (ref 0.6–1.3)
EOSINOPHIL # BLD AUTO: 0.1 THOUSAND/ÂΜL (ref 0–0.61)
EOSINOPHIL NFR BLD AUTO: 1 % (ref 0–6)
ERYTHROCYTE [DISTWIDTH] IN BLOOD BY AUTOMATED COUNT: 12.3 % (ref 11.6–15.1)
GFR SERPL CREATININE-BSD FRML MDRD: 112 ML/MIN/1.73SQ M
GLUCOSE SERPL-MCNC: 111 MG/DL (ref 65–140)
HCT VFR BLD AUTO: 40.7 % (ref 36.5–49.3)
HGB BLD-MCNC: 13.3 G/DL (ref 12–17)
IMM GRANULOCYTES # BLD AUTO: 0.04 THOUSAND/UL (ref 0–0.2)
IMM GRANULOCYTES NFR BLD AUTO: 0 % (ref 0–2)
LYMPHOCYTES # BLD AUTO: 1.64 THOUSANDS/ÂΜL (ref 0.6–4.47)
LYMPHOCYTES NFR BLD AUTO: 15 % (ref 14–44)
MCH RBC QN AUTO: 29.2 PG (ref 26.8–34.3)
MCHC RBC AUTO-ENTMCNC: 32.7 G/DL (ref 31.4–37.4)
MCV RBC AUTO: 89 FL (ref 82–98)
MONOCYTES # BLD AUTO: 0.96 THOUSAND/ÂΜL (ref 0.17–1.22)
MONOCYTES NFR BLD AUTO: 9 % (ref 4–12)
NEUTROPHILS # BLD AUTO: 8.16 THOUSANDS/ÂΜL (ref 1.85–7.62)
NEUTS SEG NFR BLD AUTO: 74 % (ref 43–75)
NRBC BLD AUTO-RTO: 0 /100 WBCS
PLATELET # BLD AUTO: 371 THOUSANDS/UL (ref 149–390)
PMV BLD AUTO: 9.8 FL (ref 8.9–12.7)
POTASSIUM SERPL-SCNC: 3.9 MMOL/L (ref 3.5–5.3)
RBC # BLD AUTO: 4.56 MILLION/UL (ref 3.88–5.62)
SODIUM SERPL-SCNC: 137 MMOL/L (ref 135–147)
WBC # BLD AUTO: 10.98 THOUSAND/UL (ref 4.31–10.16)

## 2024-04-20 PROCEDURE — 87186 SC STD MICRODIL/AGAR DIL: CPT | Performed by: PHYSICIAN ASSISTANT

## 2024-04-20 PROCEDURE — 87147 CULTURE TYPE IMMUNOLOGIC: CPT | Performed by: PHYSICIAN ASSISTANT

## 2024-04-20 PROCEDURE — 10160 PNXR ASPIR ABSC HMTMA BULLA: CPT | Performed by: STUDENT IN AN ORGANIZED HEALTH CARE EDUCATION/TRAINING PROGRAM

## 2024-04-20 PROCEDURE — 0D9L3ZZ DRAINAGE OF TRANSVERSE COLON, PERCUTANEOUS APPROACH: ICD-10-PCS | Performed by: STUDENT IN AN ORGANIZED HEALTH CARE EDUCATION/TRAINING PROGRAM

## 2024-04-20 PROCEDURE — 85025 COMPLETE CBC W/AUTO DIFF WBC: CPT | Performed by: PHYSICIAN ASSISTANT

## 2024-04-20 PROCEDURE — 87205 SMEAR GRAM STAIN: CPT | Performed by: PHYSICIAN ASSISTANT

## 2024-04-20 PROCEDURE — 77012 CT SCAN FOR NEEDLE BIOPSY: CPT | Performed by: STUDENT IN AN ORGANIZED HEALTH CARE EDUCATION/TRAINING PROGRAM

## 2024-04-20 PROCEDURE — 80048 BASIC METABOLIC PNL TOTAL CA: CPT | Performed by: PHYSICIAN ASSISTANT

## 2024-04-20 PROCEDURE — 87070 CULTURE OTHR SPECIMN AEROBIC: CPT | Performed by: PHYSICIAN ASSISTANT

## 2024-04-20 PROCEDURE — C1769 GUIDE WIRE: HCPCS

## 2024-04-20 PROCEDURE — 87077 CULTURE AEROBIC IDENTIFY: CPT | Performed by: PHYSICIAN ASSISTANT

## 2024-04-20 PROCEDURE — 87106 FUNGI IDENTIFICATION YEAST: CPT | Performed by: PHYSICIAN ASSISTANT

## 2024-04-20 PROCEDURE — NC001 PR NO CHARGE: Performed by: STUDENT IN AN ORGANIZED HEALTH CARE EDUCATION/TRAINING PROGRAM

## 2024-04-20 RX ORDER — LIDOCAINE WITH 8.4% SOD BICARB 0.9%(10ML)
SYRINGE (ML) INJECTION AS NEEDED
Status: COMPLETED | OUTPATIENT
Start: 2024-04-20 | End: 2024-04-20

## 2024-04-20 RX ORDER — MIDAZOLAM HYDROCHLORIDE 2 MG/2ML
INJECTION, SOLUTION INTRAMUSCULAR; INTRAVENOUS AS NEEDED
Status: COMPLETED | OUTPATIENT
Start: 2024-04-20 | End: 2024-04-20

## 2024-04-20 RX ORDER — FENTANYL CITRATE 50 UG/ML
INJECTION, SOLUTION INTRAMUSCULAR; INTRAVENOUS AS NEEDED
Status: COMPLETED | OUTPATIENT
Start: 2024-04-20 | End: 2024-04-20

## 2024-04-20 RX ADMIN — SODIUM CHLORIDE, SODIUM LACTATE, POTASSIUM CHLORIDE, AND CALCIUM CHLORIDE 125 ML/HR: .6; .31; .03; .02 INJECTION, SOLUTION INTRAVENOUS at 08:26

## 2024-04-20 RX ADMIN — MIDAZOLAM HYDROCHLORIDE 1 MG: 1 INJECTION, SOLUTION INTRAMUSCULAR; INTRAVENOUS at 14:46

## 2024-04-20 RX ADMIN — CEFTRIAXONE SODIUM 1000 MG: 10 INJECTION, POWDER, FOR SOLUTION INTRAVENOUS at 21:41

## 2024-04-20 RX ADMIN — METRONIDAZOLE 500 MG: 500 INJECTION, SOLUTION INTRAVENOUS at 05:51

## 2024-04-20 RX ADMIN — CEFTRIAXONE SODIUM 1000 MG: 10 INJECTION, POWDER, FOR SOLUTION INTRAVENOUS at 00:39

## 2024-04-20 RX ADMIN — FENTANYL CITRATE 50 MCG: 50 INJECTION, SOLUTION INTRAMUSCULAR; INTRAVENOUS at 14:50

## 2024-04-20 RX ADMIN — Medication 10 ML: at 14:49

## 2024-04-20 RX ADMIN — MIDAZOLAM HYDROCHLORIDE 1 MG: 1 INJECTION, SOLUTION INTRAMUSCULAR; INTRAVENOUS at 14:49

## 2024-04-20 RX ADMIN — METRONIDAZOLE 500 MG: 500 INJECTION, SOLUTION INTRAVENOUS at 13:10

## 2024-04-20 RX ADMIN — METRONIDAZOLE 500 MG: 500 INJECTION, SOLUTION INTRAVENOUS at 21:41

## 2024-04-20 RX ADMIN — FENTANYL CITRATE 50 MCG: 50 INJECTION, SOLUTION INTRAMUSCULAR; INTRAVENOUS at 14:46

## 2024-04-20 NOTE — PROGRESS NOTES
Interventional Radiology Preprocedure Note    History/Indication for procedure:   Melanie Langford is a 38 y.o. male with a PMH of diverticular abscess who presents for image guided abscess drainage.    Relevant past medical history:    Past Medical History:   Diagnosis Date    Acute diverticulitis 04/07/2024    Anxiety      Patient Active Problem List   Diagnosis    Nicotine abuse    Stress-related physiological response affecting physical condition    Sebaceous cyst    Class 1 obesity due to excess calories without serious comorbidity with body mass index (BMI) of 31.0 to 31.9 in adult    Acute diverticulitis       /68   Pulse 85   Temp 98.4 °F (36.9 °C)   Resp 19   Ht 6' (1.829 m)   Wt 99.5 kg (219 lb 5.7 oz)   SpO2 100%   BMI 29.75 kg/m²     Medications:    Inpatient Medications:     Scheduled Medications:  Current Facility-Administered Medications   Medication Dose Route Frequency Provider Last Rate    acetaminophen  650 mg Oral Q6H PRN Nila Rivera MD      cefTRIAXone  1,000 mg Intravenous Q24H Nila Rivera MD 1,000 mg (04/20/24 0039)    fentaNYL   Intravenous PRN Latrell Tomlin MD      heparin (porcine)  5,000 Units Subcutaneous Q8H [facility] Nila Rivera MD      lactated ringers  125 mL/hr Intravenous Continuous Nila Rivera  mL/hr (04/20/24 0826)    lidocaine 1% buffered   Infiltration PRN Latrell Tomlin MD      metroNIDAZOLE  500 mg Intravenous Q8H Nila Rviera  mg (04/20/24 1310)    midazolam    PRN Latrell Tomlin MD      morphine injection  2 mg Intravenous Q4H PRN Nila Rivera MD      ondansetron  4 mg Intravenous Q6H PRN Nila Rivera MD      oxyCODONE  5 mg Oral Q4H PRN Nila Rivera MD         Infusions:  lactated ringers, 125 mL/hr, Last Rate: 125 mL/hr (04/20/24 0826)        PRN:    acetaminophen    fentaNYL    lidocaine 1% buffered    midazolam    morphine injection    ondansetron    oxyCODONE    Outpatient Medications:  No current  facility-administered medications on file prior to encounter.     No current outpatient medications on file prior to encounter.       No Known Allergies    Anticoagulants: none    ASA classification: ASA 3 - Patient with moderate systemic disease with functional limitations    Airway Assessment: III (soft and hard palate and base of uvula visible)    Relevant family history: None    Relevant review of systems: None    Prior sedation/anesthesia: yes    Can the patient lie flat? Yes     NPO Status: yes    Labs:   CBC with diff:   Lab Results   Component Value Date    WBC 10.98 (H) 04/20/2024    HGB 13.3 04/20/2024    HCT 40.7 04/20/2024    MCV 89 04/20/2024     04/20/2024    RBC 4.56 04/20/2024    MCH 29.2 04/20/2024    MCHC 32.7 04/20/2024    RDW 12.3 04/20/2024    MPV 9.8 04/20/2024    NRBC 0 04/20/2024     BMP/CMP:  Lab Results   Component Value Date    K 3.9 04/20/2024     04/20/2024    CO2 24 04/20/2024    BUN 8 04/20/2024    CREATININE 0.84 04/20/2024    CALCIUM 9.0 04/20/2024    AST 22 04/19/2024    ALT 26 04/19/2024    ALKPHOS 89 04/19/2024    EGFR 112 04/20/2024   ,     Coags:   Lab Results   Component Value Date    PTT 34 04/03/2024    INR 1.11 04/03/2024   ,          Relevant imaging studies:   Reviewed.    Directed physical examination:  I agree with the physical exam performed on 4/19/24 and there are no additional changes.    Assessment/Plan:   Abscess drainage.    Sedation/Anesthesia plan:  Moderate sedation will be used as needed for procedure.    Consent with alternatives to the procedure, risks and benefits have been explained and discussed with the patient/patient's family: yes.

## 2024-04-20 NOTE — NURSING NOTE
Pt questioned why he was not having any lab work done this morning. Reached out to Dr. Rivera (on call for General surgery) she states no labs were needed at this time.  Pt notified   Pt plan of care on going - no further concerns as of present and expresses no other needs at this time- call light within reach.

## 2024-04-20 NOTE — UTILIZATION REVIEW
NOTIFICATION OF INPATIENT ADMISSION   AUTHORIZATION REQUEST   SERVICING FACILITY:   [place] [address]  Tax ID: [id number]  NPI: [id number] ATTENDING PROVIDER:  Attending Name and NPI#: Nila Rivera Md [[id number]]  Address: [address] [place] [address]  Phone: [phone]     ADMISSION INFORMATION:  Place of Service: Inpatient [facility]  Place of Service Code: 21  Inpatient Admission Date/Time: 4/19/24  8:30 PM  Discharge Date/Time: No discharge date for patient encounter.  Admitting Diagnosis Code/Description:  Fever [R50.9]  Diverticulitis of intestine with abscess [K57.80]     UTILIZATION REVIEW CONTACT:  Frieda Ellis Utilization   Network Utilization Review Department  Phone: [phone]  Fax [phone]  Email: [email or web address]  Contact for approvals/pending authorizations, clinical reviews, and discharge.     PHYSICIAN ADVISORY SERVICES:  Medical Necessity Denial & Didq-vm-Fmds Review  Phone: [phone]  Fax: [phone]  Email: [email or web address]     DISCHARGE SUPPORT TEAM:  For Patients Discharge Needs & Updates  Phone: [phone] opt. 2 Fax: [phone]  Email: [email or web address]

## 2024-04-20 NOTE — UTILIZATION REVIEW
Initial Clinical Review    Admission: Date/Time/Statement:   Admission Orders (From admission, onward)       Ordered        04/19/24 2029  INPATIENT ADMISSION  Once                          Orders Placed This Encounter   Procedures    INPATIENT ADMISSION     Standing Status:   Standing     Number of Occurrences:   1     Order Specific Question:   Level of Care     Answer:   Med Surg [16]     Order Specific Question:   Estimated length of stay     Answer:   More than 2 Midnights     Order Specific Question:   Certification     Answer:   I certify that inpatient services are medically necessary for this patient for a duration of greater than two midnights. See H&P and MD Progress Notes for additional information about the patient's course of treatment.     ED Arrival Information       Expected   -    Arrival   4/19/2024 17:46    Acuity   Less Urgent              Means of arrival   Walk-In    Escorted by   Self    Service   Surgery-General    Admission type   Emergency              Arrival complaint   Fever             Chief Complaint   Patient presents with    Fever     Patient arrived to ER c/o fever at home was 99.8 that was noticed this afternoon. Patient states he was here 2 weeks ago for diverticulitis with bowel perforation and doctors said to come back if any signs of an infection.        Initial Presentation: 38 y.o. male to ED presents for Fever of 99.6 and Leukocytosis of 19. . Complete course of antibiotics. Recent hospitalization on 4/3/24 for perforated diverticulitis without drainable fluid collection at the splenic flexure. Pt felt well until last evening when developed a low grade fever. Seen in the office 2 wks post discharge and advanced to a high fiber. Pt was to f/u for Colonoscopy.   Admit to Inpatient Dx; Perforated Diverticulitis at the Splenic Flexure CT demonstrates more organized-appearing fluid and gas collection in the left paracolic gutter interposed between the left kidney, the colon, and  the spleen, which measures approximately 5.2 x 2.8 x 12.6 cm (series 2/62, 602/61). Portions of the collection are rim-enhancing. There is some additional nonloculated fluid posterior to the spleen.  IR consult for drainage of fluid collection. Given Iv antibiotics in ED and continue. Bld cultures. NPO and IVFs.     Date: 4/20   Day 2:   Per IR; Abscess drainage. Moderate sedation will be used as needed for procedure.     4/20 IR - S/p 20 ml pus aspirated.     Continues on Iv antibiotics and IVFs. Pain control.     ED Triage Vitals   Temperature Pulse Respirations Blood Pressure SpO2   04/19/24 1750 04/19/24 1750 04/19/24 1750 04/19/24 1750 04/19/24 1750   99.6 °F (37.6 °C) 98 18 138/83 100 %      Temp Source Heart Rate Source Patient Position - Orthostatic VS BP Location FiO2 (%)   04/19/24 1750 04/19/24 1750 04/19/24 2128 04/19/24 2128 --   Oral Monitor Sitting Right arm       Pain Score       04/19/24 2254       No Pain          Wt Readings from Last 1 Encounters:   04/19/24 99.5 kg (219 lb 5.7 oz)     Additional Vital Signs:   4/20/24 14:57:09 -- 85 19 140/68 -- 100 % -- -- -- --   04/20/24 14:49:55 -- 90 19 137/69 -- 100 % -- -- -- --   04/20/24 14:36:22 -- 89 20 134/70 -- 100 % -- -- -- --   04/20/24 14:25:23 -- -- -- -- -- -- 28 2 L/min Nasal cannula --   04/20/24 07:09:05 98.4 °F (36.9 °C) 79 19 134/74 94 98 % -- -- None (Room air) --   04/19/24 21:28:40 101.1 °F (38.4 °C) Abnormal  104 18 150/85 107 97 % -- -- None (Room air) Sitting     Pertinent Labs/Diagnostic Test Results:   CT abdomen pelvis with contrast   Final Result by Kevin Shelton MD (04/19 2019)      Organizing fluid and gas collection associated with perforated diverticulitis of the splenic flexure. This may now be amenable to drainage if clinically indicated.      The study was marked in EPIC for immediate notification.         Workstation performed: [id number]         IR drainage tube placement    (Results Pending)     Results from  last 7 days   Lab Units 04/19/24  1832   SARS-COV-2  Negative     Results from last 7 days   Lab Units 04/20/24  0945 04/19/24  2205 04/19/24  1832   WBC Thousand/uL 10.98*  --  19.05*   HEMOGLOBIN g/dL 13.3  --  13.9   HEMATOCRIT % 40.7  --  42.9   PLATELETS Thousands/uL 371 423* 440*   TOTAL NEUT ABS Thousands/µL 8.16*  --  15.63*         Results from last 7 days   Lab Units 04/20/24  0945 04/19/24  1832   SODIUM mmol/L 137 137   POTASSIUM mmol/L 3.9 4.3   CHLORIDE mmol/L 104 102   CO2 mmol/L 24 25   ANION GAP mmol/L 9 10   BUN mg/dL 8 8   CREATININE mg/dL 0.84 0.90   EGFR ml/min/1.73sq m 112 109   CALCIUM mg/dL 9.0 9.4     Results from last 7 days   Lab Units 04/19/24  1832   AST U/L 22   ALT U/L 26   ALK PHOS U/L 89   TOTAL PROTEIN g/dL 8.3   ALBUMIN g/dL 4.3   TOTAL BILIRUBIN mg/dL 0.59         Results from last 7 days   Lab Units 04/20/24  0945 04/19/24  1832   GLUCOSE RANDOM mg/dL 111 97       Results from last 7 days   Lab Units 04/19/24  1832   LACTIC ACID mmol/L 1.5       Results from last 7 days   Lab Units 04/19/24  1834   CLARITY UA  Clear   COLOR UA  Colorless   SPEC GRAV UA  1.002*   PH UA  6.0   GLUCOSE UA mg/dl Negative   KETONES UA mg/dl Negative   BLOOD UA  Negative   PROTEIN UA mg/dl Negative   NITRITE UA  Negative   BILIRUBIN UA  Negative   UROBILINOGEN UA (BE) mg/dl <2.0   LEUKOCYTES UA  Negative     Results from last 7 days   Lab Units 04/19/24  1832   INFLUENZA A PCR  Negative   INFLUENZA B PCR  Negative   RSV PCR  Negative       Results from last 7 days   Lab Units 04/19/24 2035 04/19/24 2034   BLOOD CULTURE  Received in Microbiology Lab. Culture in Progress. Received in Microbiology Lab. Culture in Progress.       ED Treatment:   Medication Administration from 04/19/2024 1746 to 04/19/2024 2122         Date/Time Order Dose Route Action     04/19/2024 1951 EDT iohexol (OMNIPAQUE) 350 MG/ML injection (MULTI-DOSE) 100 mL 100 mL Intravenous Given     04/19/2024 2050 EDT sodium chloride 0.9 %  bolus 1,000 mL 1,000 mL Intravenous New Bag     04/19/2024 2049 EDT piperacillin-tazobactam (ZOSYN) IVPB 4.5 g 4.5 g Intravenous New Bag          Past Medical History:   Diagnosis Date    Acute diverticulitis 04/07/2024    Anxiety      Present on Admission:  **None**      Admitting Diagnosis: Fever [R50.9]  Diverticulitis of intestine with abscess [K57.80]  Age/Sex: 38 y.o. male    Admission Orders:  Scheduled Medications:  cefTRIAXone, 1,000 mg, Intravenous, Q24H  heparin (porcine), 5,000 Units, Subcutaneous, Q8H JORGE  metroNIDAZOLE, 500 mg, Intravenous, Q8H      Continuous IV Infusions:  lactated ringers, 125 mL/hr, Intravenous, Continuous      PRN Meds:  acetaminophen, 650 mg, Oral, Q6H PRN  morphine injection, 2 mg, Intravenous, Q4H PRN  ondansetron, 4 mg, Intravenous, Q6H PRN  oxyCODONE, 5 mg, Oral, Q4H PRN        None    Network Utilization Review Department  ATTENTION: Please call with any questions or concerns to [phone] and carefully listen to the prompts so that you are directed to the right person. All voicemails are confidential.   For Discharge needs, contact Care Management DC Support Team at [phone] opt. 2  Send all requests for admission clinical reviews, approved or denied determinations and any other requests to dedicated fax number below belonging to the campus where the patient is receiving treatment. List of dedicated fax numbers for the Facilities:  FACILITY NAME UR FAX NUMBER   ADMISSION DENIALS (Administrative/Medical Necessity) [phone]   DISCHARGE SUPPORT TEAM (NETWORK) [phone]   PARENT CHILD HEALTH (Maternity/NICU/Pediatrics) [phone]   [facility] [phone]   [facility] [phone]   [facility] [phone]   [facility] [phone]   [facility] [phone]   [facility] [phone]   [facility] [phone]   [facility] [phone]   [facility] [phone]   [facility] [phone]   [facility] [phone]   [facility] [phone]

## 2024-04-20 NOTE — PLAN OF CARE
Problem: PAIN - ADULT  Goal: Verbalizes/displays adequate comfort level or baseline comfort level  Description: Interventions:  - Encourage patient to monitor pain and request assistance  - Assess pain using appropriate pain scale  - Administer analgesics based on type and severity of pain and evaluate response  - Implement non-pharmacological measures as appropriate and evaluate response  - Consider cultural and social influences on pain and pain management  - Notify physician/advanced practitioner if interventions unsuccessful or patient reports new pain  Outcome: Progressing     Problem: INFECTION - ADULT  Goal: Absence or prevention of progression during hospitalization  Description: INTERVENTIONS:  - Assess and monitor for signs and symptoms of infection  - Monitor lab/diagnostic results  - Monitor all insertion sites, i.e. indwelling lines, tubes, and drains  - Monitor endotracheal if appropriate and nasal secretions for changes in amount and color  - Blanca appropriate cooling/warming therapies per order  - Administer medications as ordered  - Instruct and encourage patient and family to use good hand hygiene technique  - Identify and instruct in appropriate isolation precautions for identified infection/condition  Outcome: Progressing  Goal: Absence of fever/infection during neutropenic period  Description: INTERVENTIONS:  - Monitor WBC    Outcome: Progressing     Problem: SAFETY ADULT  Goal: Patient will remain free of falls  Description: INTERVENTIONS:  - Educate patient/family on patient safety including physical limitations  - Instruct patient to call for assistance with activity   - Consult OT/PT to assist with strengthening/mobility   - Keep Call bell within reach  - Keep bed low and locked with side rails adjusted as appropriate  - Keep care items and personal belongings within reach  - Initiate and maintain comfort rounds  - Make Fall Risk Sign visible to staff  - Offer Toileting every    Hours,  in advance of need  - Initiate/Maintain alarm  - Obtain necessary fall risk management equipment:   - Apply yellow socks and bracelet for high fall risk patients  - Consider moving patient to room near nurses station  Outcome: Progressing  Goal: Maintain or return to baseline ADL function  Description: INTERVENTIONS:  -  Assess patient's ability to carry out ADLs; assess patient's baseline for ADL function and identify physical deficits which impact ability to perform ADLs (bathing, care of mouth/teeth, toileting, grooming, dressing, etc.)  - Assess/evaluate cause of self-care deficits   - Assess range of motion  - Assess patient's mobility; develop plan if impaired  - Assess patient's need for assistive devices and provide as appropriate  - Encourage maximum independence but intervene and supervise when necessary  - Involve family in performance of ADLs  - Assess for home care needs following discharge   - Consider OT consult to assist with ADL evaluation and planning for discharge  - Provide patient education as appropriate  Outcome: Progressing  Goal: Maintains/Returns to pre admission functional level  Description: INTERVENTIONS:  - Perform AM-PAC 6 Click Basic Mobility/ Daily Activity assessment daily.  - Set and communicate daily mobility goal to care team and patient/family/caregiver.   - Collaborate with rehabilitation services on mobility goals if consulted  - Perform Range of Motion 3 times a day.  - Reposition patient every 2 hours.  - Dangle patient 3 times a day  - Stand patient 3 times a day  - Ambulate patient 3 times a day  - Out of bed to chair 3 times a day   - Out of bed for meals 3 times a day  - Out of bed for toileting  - Record patient progress and toleration of activity level   Outcome: Progressing     Problem: DISCHARGE PLANNING  Goal: Discharge to home or other facility with appropriate resources  Description: INTERVENTIONS:  - Identify barriers to discharge w/patient and caregiver  -  Arrange for needed discharge resources and transportation as appropriate  - Identify discharge learning needs (meds, wound care, etc.)  - Arrange for interpretive services to assist at discharge as needed  - Refer to Case Management Department for coordinating discharge planning if the patient needs post-hospital services based on physician/advanced practitioner order or complex needs related to functional status, cognitive ability, or social support system  Outcome: Progressing     Problem: Knowledge Deficit  Goal: Patient/family/caregiver demonstrates understanding of disease process, treatment plan, medications, and discharge instructions  Description: Complete learning assessment and assess knowledge base.  Interventions:  - Provide teaching at level of understanding  - Provide teaching via preferred learning methods  Outcome: Progressing

## 2024-04-20 NOTE — BRIEF OP NOTE (RAD/CATH)
INTERVENTIONAL RADIOLOGY PROCEDURE NOTE    Date: 4/20/2024    Procedure:   LUQ abscess aspiration      Preoperative diagnosis:   1. Diverticulitis of intestine with abscess         Postoperative diagnosis: Same.    Surgeon: Latrell Tomlin MD     Assistant: None. No qualified resident was available.    Blood loss: 0 ml    Specimens: sent to lab     Findings:   Image guided LUQ abscess aspiration, per surgery request.    20 ml pus aspirated.    Complications: None immediate.    Anesthesia: conscious sedation

## 2024-04-20 NOTE — PLAN OF CARE

## 2024-04-20 NOTE — H&P
GENERAL SURGERY HISTORY AND PHYSICAL      Melanie Langford 38 y.o. male MRN: [id number]  Unit/Bed#: -01 Encounter: [id number]      Assessment/Plan   Perforated Diverticulitis at the Splenic Flexure CT demonstrates   more organized-appearing fluid and gas collection in the left   paracolic gutter interposed between the left kidney, the colon, and the spleen, which measures approximately 5.2 x 2.8 x 12.6 cm (series 2/62, 602/61). Portions of the collection are rim-enhancing. There is some additional nonloculated fluid posterior   to the spleen.  4/3/24 hospitalization for same, no drainable collection at that time.treated with conservative measures with bowel rest and antibiotics.     Started on antibiotics current leukocytosis is 10 from 19   Fever over night, current temp is 98      -consult IR for drainage of fluid colletion  -start scheduled Rocephin and Flagyl, one time dose of Zosyn was given in the ED   -follow results of blood cultures   -NPO  -IVF  -DVT PPX  -encourage ambulation and IS   -follow exam, blood work .     Chief Complaint I had a fever       HPI: Melanie Langford is a 38 y.o. year old male  PHM: 4/3/24 hospitalization for  perforated diverticulitis without drainable fluid collection at the splenic flexure. Full course of antibiotics with bowel rest/modified diet.  Pt felt well until last evening when developed a low grade fever and came to the ED    who presents with fever 99.6 and heart rate of 98  Pt states he completed the course of antibiotics, followed a low residual diet from his admission, he was seen in the office 2 weeks following discharge, and advanced to a high fiber diet. He was to follow up with a colonoscopy.     He was discharged from surgery at office appointment 2 days ago. He was started in regular diet, he did not experience any increase in pain, n.v.d.c.     Last evening he states he developed a fever and reported to the ED.  Leukocytosis on admission was 19,   Imaging Studies: CT  "abdomen pelvis with contrast    Result Date: 2024  Impression: Organizing fluid and gas collection associated with perforated diverticulitis of the splenic flexure. This may now be amenable to drainage if clinically indicated. The study was marked in EPIC for immediate notification. Workstation performed: [id number]     Lab Results: Coags: No results found for: \"PT\", \"PTT\", \"INR\", Creatinine:   Lab Results   Component Value Date    CREATININE 0.84 2024   , Lipid Panel: No results found for: \"CHOL\", CBC with diff:   Lab Results   Component Value Date    WBC 10.98 (H) 2024    HGB 13.3 2024    HCT 40.7 2024    MCV 89 2024     2024    RBC 4.56 2024    MCH 29.2 2024    MCHC 32.7 2024    RDW 12.3 2024    MPV 9.8 2024    NRBC 0 2024   , BMP/CMP:   Lab Results   Component Value Date    SODIUM 137 2024    K 3.9 2024     2024    CO2 24 2024    BUN 8 2024    CREATININE 0.84 2024    CALCIUM 9.0 2024    AST 22 2024    ALT 26 2024    ALKPHOS 89 2024    EGFR 112 2024   , Coags: No results found for: \"PT\", \"PTT\", \"INR\"      Historical Information   Past Medical History:   Diagnosis Date    Acute diverticulitis 2024    Anxiety      History reviewed. No pertinent surgical history.  Social History   Social History     Substance and Sexual Activity   Alcohol Use Yes    Comment: rarely     Social History     Substance and Sexual Activity   Drug Use Not Currently     Social History     Tobacco Use   Smoking Status Former    Current packs/day: 0.00    Average packs/day: 0.3 packs/day for 8.0 years (2.0 ttl pk-yrs)    Types: Cigarettes    Quit date: 9/3/2022    Years since quittin.6    Passive exposure: Past   Smokeless Tobacco Never     Family History: no pertinent family history  No Known Allergies  Meds/Allergies     current meds:   Current Facility-Administered Medications "   Medication Dose Route Frequency    acetaminophen (TYLENOL) tablet 650 mg  650 mg Oral Q6H PRN    ceftriaxone (ROCEPHIN) 1 g/50 mL in dextrose IVPB  1,000 mg Intravenous Q24H    heparin (porcine) subcutaneous injection 5,000 Units  5,000 Units Subcutaneous Q8H JORGE    lactated ringers infusion  125 mL/hr Intravenous Continuous    metroNIDAZOLE (FLAGYL) IVPB (premix) 500 mg 100 mL  500 mg Intravenous Q8H    morphine injection 2 mg  2 mg Intravenous Q4H PRN    ondansetron (ZOFRAN) injection 4 mg  4 mg Intravenous Q6H PRN    oxyCODONE (ROXICODONE) IR tablet 5 mg  5 mg Oral Q4H PRN         Objective   Vitals: ,Body mass index is 29.75 kg/m².  No intake or output data in the 24 hours ending 04/20/24 0919  @LDASHORT    @ROS:  12 set ROS reviewed and negative except for what is noted in the HPI      Physical Exam:Blood pressure 134/74, pulse 79, temperature 98.4 °F (36.9 °C), resp. rate 19, height 6' (1.829 m), weight 99.5 kg (219 lb 5.7 oz), SpO2 98%.    General appearance: alert, appears stated age and cooperative  HEENT: PERRLA, EOMI, sclera clear, anicterus  Back: no tenderness,deformity,   Lungs:clear throughout  Heart:: RRR, S1, S2 normal, no murmur    Abdomen: soft, no tenderness on exam  NBS       Extremities: FROM no joint deformities, motor,sensory intact, pedal edema: none   Skin: no rashes or lesions   Neurologic: CN II-XII grossly intact, no tremor, affect appropriate        VTE Prophylaxis: Sequential compression device (Venodyne)  and Heparin     Code Status: Level 1 - Full Code  Advance Directive and Living Will:      Power of :    POLST:      Romana De La Fuente PA-C  4/20/2024

## 2024-04-21 VITALS
DIASTOLIC BLOOD PRESSURE: 80 MMHG | WEIGHT: 219.36 LBS | TEMPERATURE: 98.2 F | OXYGEN SATURATION: 97 % | HEIGHT: 72 IN | BODY MASS INDEX: 29.71 KG/M2 | HEART RATE: 74 BPM | RESPIRATION RATE: 16 BRPM | SYSTOLIC BLOOD PRESSURE: 130 MMHG

## 2024-04-21 LAB
ANION GAP SERPL CALCULATED.3IONS-SCNC: 9 MMOL/L (ref 4–13)
BASOPHILS # BLD AUTO: 0.05 THOUSANDS/ÂΜL (ref 0–0.1)
BASOPHILS NFR BLD AUTO: 1 % (ref 0–1)
BUN SERPL-MCNC: 9 MG/DL (ref 5–25)
CALCIUM SERPL-MCNC: 9 MG/DL (ref 8.4–10.2)
CHLORIDE SERPL-SCNC: 104 MMOL/L (ref 96–108)
CO2 SERPL-SCNC: 25 MMOL/L (ref 21–32)
CREAT SERPL-MCNC: 0.85 MG/DL (ref 0.6–1.3)
EOSINOPHIL # BLD AUTO: 0.16 THOUSAND/ÂΜL (ref 0–0.61)
EOSINOPHIL NFR BLD AUTO: 2 % (ref 0–6)
ERYTHROCYTE [DISTWIDTH] IN BLOOD BY AUTOMATED COUNT: 12 % (ref 11.6–15.1)
GFR SERPL CREATININE-BSD FRML MDRD: 112 ML/MIN/1.73SQ M
GLUCOSE SERPL-MCNC: 98 MG/DL (ref 65–140)
HCT VFR BLD AUTO: 39.9 % (ref 36.5–49.3)
HGB BLD-MCNC: 13 G/DL (ref 12–17)
IMM GRANULOCYTES # BLD AUTO: 0.03 THOUSAND/UL (ref 0–0.2)
IMM GRANULOCYTES NFR BLD AUTO: 0 % (ref 0–2)
LYMPHOCYTES # BLD AUTO: 1.89 THOUSANDS/ÂΜL (ref 0.6–4.47)
LYMPHOCYTES NFR BLD AUTO: 25 % (ref 14–44)
MCH RBC QN AUTO: 29.1 PG (ref 26.8–34.3)
MCHC RBC AUTO-ENTMCNC: 32.6 G/DL (ref 31.4–37.4)
MCV RBC AUTO: 89 FL (ref 82–98)
MONOCYTES # BLD AUTO: 0.58 THOUSAND/ÂΜL (ref 0.17–1.22)
MONOCYTES NFR BLD AUTO: 8 % (ref 4–12)
NEUTROPHILS # BLD AUTO: 4.98 THOUSANDS/ÂΜL (ref 1.85–7.62)
NEUTS SEG NFR BLD AUTO: 64 % (ref 43–75)
NRBC BLD AUTO-RTO: 0 /100 WBCS
PLATELET # BLD AUTO: 403 THOUSANDS/UL (ref 149–390)
PMV BLD AUTO: 10.1 FL (ref 8.9–12.7)
POTASSIUM SERPL-SCNC: 4 MMOL/L (ref 3.5–5.3)
RBC # BLD AUTO: 4.47 MILLION/UL (ref 3.88–5.62)
SODIUM SERPL-SCNC: 138 MMOL/L (ref 135–147)
WBC # BLD AUTO: 7.69 THOUSAND/UL (ref 4.31–10.16)

## 2024-04-21 PROCEDURE — 85025 COMPLETE CBC W/AUTO DIFF WBC: CPT | Performed by: PHYSICIAN ASSISTANT

## 2024-04-21 PROCEDURE — 99238 HOSP IP/OBS DSCHRG MGMT 30/<: CPT | Performed by: PHYSICIAN ASSISTANT

## 2024-04-21 PROCEDURE — 80048 BASIC METABOLIC PNL TOTAL CA: CPT | Performed by: PHYSICIAN ASSISTANT

## 2024-04-21 RX ORDER — AMOXICILLIN AND CLAVULANATE POTASSIUM 875; 125 MG/1; MG/1
1 TABLET, FILM COATED ORAL EVERY 12 HOURS SCHEDULED
Qty: 24 TABLET | Refills: 0 | Status: SHIPPED | OUTPATIENT
Start: 2024-04-21 | End: 2024-05-03

## 2024-04-21 RX ADMIN — METRONIDAZOLE 500 MG: 500 INJECTION, SOLUTION INTRAVENOUS at 05:47

## 2024-04-21 NOTE — DISCHARGE SUMMARY
Discharge Summary - Melanie Langford 38 y.o. male MRN: [id number]    Unit/Bed#: -01 Encounter: [id number]        Admitting Diagnosis: Fever [R50.9]  Diverticulitis of intestine with abscess [K57.80]    HPI: Admission Date: 4/19/2024 as per Admit Note by Romana WILSON  Melanie Langford is a 38 y.o. year old male  PHM: 4/3/24 hospitalization for  perforated diverticulitis without drainable fluid collection at the splenic flexure. Full course of antibiotics with bowel rest/modified diet.  Pt felt well until last evening when developed a low grade fever and came to the ED    who presents with fever 99.6 and heart rate of 98  Pt states he completed the course of antibiotics, followed a low residual diet from his admission, he was seen in the office 2 weeks following discharge, and advanced to a high fiber diet. He was to follow up with a colonoscopy.      He was discharged from surgery at office appointment 2 days ago. He was started in regular diet, he did not experience any increase in pain, n.v.d.c.      Last evening he states he developed a fever and reported to the ED.  Leukocytosis on admission was 19,   consult IR for drainage of fluid colletion  -start scheduled Rocephin and Flagyl, one time dose of Zosyn was given in the ED   -follow results of blood cultures   -NPO  -IVF  -DVT PPX  -encourage ambulation and IS   -follow exam, blood work .              Procedures Performed: IR aspiration of intra abdominal abscess due to diverticulitis     INTERVENTIONAL RADIOLOGY PROCEDURE NOTE     Date: 4/20/2024     Procedure:   LUQ abscess aspiration        Preoperative diagnosis:   1. Diverticulitis of intestine with abscess          Postoperative diagnosis: Same.     Surgeon: Latrell Tomlin MD      Assistant: None. No qualified resident was available.     Blood loss: 0 ml     Specimens: sent to lab      Findings:   Image guided LUQ abscess aspiration, per surgery request.     20 ml pus aspirated.     Complications: None  immediate.     Anesthesia: conscious sedation                   Hospital Course: the patient was admitted with the above plan, he was seen by Ir for aspiration of intra abdominal abscess as above.  Post procedure he was feeling well, he did not have pain. He was tolerating a diet and having bowel function.   The following morning his white count had normalized and he was afebrile   He will be discharged with p.o. antibiotics, followup in surgery, and will need colonoscopy in 6-8 weeks        On the day of discharge:  VSS  Lungs clear  RRR  ABD: soft, nontender, NBS,   No calf tenderness      Pt will follow up in 2 weeks   Discharge instructions were given verbally and written     Complications: none     Discharge Diagnosis: intra abdominal abscess  Diverticulitis   Fever   Condition at Discharge: good     Discharge instructions/Information to patient and family:   See after visit summary for information provided to patient and family.      Provisions for Follow-Up Care:  See after visit summary for information related to follow-up care and any pertinent home health orders.      Disposition: See After Visit Summary for discharge disposition information.    Planned Readmission: No    Discharge Statement   I spent 30  minutes discharging the patient. This time was spent on the day of discharge. I had direct contact with the patient on the day of discharge. Additional documentation is required if more than 30 minutes were spent on discharge.     Discharge Medications:  See after visit summary for reconciled discharge medications provided to patient and family.        Romana WILSON

## 2024-04-21 NOTE — DISCHARGE INSTR - AVS FIRST PAGE
Follow up with Dr. Sutton in 2 weeks  Keep your appointment with GI for follow up and for colonoscopy  Follow a low residual low fiber diet   Take tylenol as needed for pain  Continue the antibiotic until it is finished.

## 2024-04-21 NOTE — PLAN OF CARE
Problem: PAIN - ADULT  Goal: Verbalizes/displays adequate comfort level or baseline comfort level  Description: Interventions:  - Encourage patient to monitor pain and request assistance  - Assess pain using appropriate pain scale  - Administer analgesics based on type and severity of pain and evaluate response  - Implement non-pharmacological measures as appropriate and evaluate response  - Consider cultural and social influences on pain and pain management  - Notify physician/advanced practitioner if interventions unsuccessful or patient reports new pain  Outcome: Adequate for Discharge     Problem: INFECTION - ADULT  Goal: Absence or prevention of progression during hospitalization  Description: INTERVENTIONS:  - Assess and monitor for signs and symptoms of infection  - Monitor lab/diagnostic results  - Monitor all insertion sites, i.e. indwelling lines, tubes, and drains  - Monitor endotracheal if appropriate and nasal secretions for changes in amount and color  - [place] appropriate cooling/warming therapies per order  - Administer medications as ordered  - Instruct and encourage patient and family to use good hand hygiene technique  - Identify and instruct in appropriate isolation precautions for identified infection/condition  Outcome: Adequate for Discharge  Goal: Absence of fever/infection during neutropenic period  Description: INTERVENTIONS:  - Monitor WBC    Outcome: Adequate for Discharge     Problem: SAFETY ADULT  Goal: Patient will remain free of falls  Description: INTERVENTIONS:  - Educate patient/family on patient safety including physical limitations  - Instruct patient to call for assistance with activity   - Consult OT/PT to assist with strengthening/mobility   - Keep Call bell within reach  - Keep bed low and locked with side rails adjusted as appropriate  - Keep care items and personal belongings within reach  - Initiate and maintain comfort rounds  - Make Fall Risk Sign visible to  staff  - Offer Toileting every  Hours, in advance of need  - Initiate/Maintain alarm  - Obtain necessary fall risk management equipment:   - Apply yellow socks and bracelet for high fall risk patients  - Consider moving patient to room near nurses station  Outcome: Adequate for Discharge  Goal: Maintain or return to baseline ADL function  Description: INTERVENTIONS:  -  Assess patient's ability to carry out ADLs; assess patient's baseline for ADL function and identify physical deficits which impact ability to perform ADLs (bathing, care of mouth/teeth, toileting, grooming, dressing, etc.)  - Assess/evaluate cause of self-care deficits   - Assess range of motion  - Assess patient's mobility; develop plan if impaired  - Assess patient's need for assistive devices and provide as appropriate  - Encourage maximum independence but intervene and supervise when necessary  - Involve family in performance of ADLs  - Assess for home care needs following discharge   - Consider OT consult to assist with ADL evaluation and planning for discharge  - Provide patient education as appropriate  Outcome: Adequate for Discharge  Goal: Maintains/Returns to pre admission functional level  Description: INTERVENTIONS:  - Perform AM-PAC 6 Click Basic Mobility/ Daily Activity assessment daily.  - Set and communicate daily mobility goal to care team and patient/family/caregiver.   - Collaborate with rehabilitation services on mobility goals if consulted  - Perform Range of Motion  times a day.  - Reposition patient every  hours.  - Dangle patient  times a day  - Stand patient  times a day  - Ambulate patient  times a day  - Out of bed to chair  times a day   - Out of bed for meals  times a day  - Out of bed for toileting  - Record patient progress and toleration of activity level   Outcome: Adequate for Discharge     Problem: DISCHARGE PLANNING  Goal: Discharge to home or other facility with appropriate resources  Description:  INTERVENTIONS:  - Identify barriers to discharge w/patient and caregiver  - Arrange for needed discharge resources and transportation as appropriate  - Identify discharge learning needs (meds, wound care, etc.)  - Arrange for interpretive services to assist at discharge as needed  - Refer to Case Management Department for coordinating discharge planning if the patient needs post-hospital services based on physician/advanced practitioner order or complex needs related to functional status, cognitive ability, or social support system  Outcome: Adequate for Discharge     Problem: Knowledge Deficit  Goal: Patient/family/caregiver demonstrates understanding of disease process, treatment plan, medications, and discharge instructions  Description: Complete learning assessment and assess knowledge base.  Interventions:  - Provide teaching at level of understanding  - Provide teaching via preferred learning methods  Outcome: Adequate for Discharge

## 2024-04-21 NOTE — PLAN OF CARE
Problem: PAIN - ADULT  Goal: Verbalizes/displays adequate comfort level or baseline comfort level  Description: Interventions:  - Encourage patient to monitor pain and request assistance  - Assess pain using appropriate pain scale  - Administer analgesics based on type and severity of pain and evaluate response  - Implement non-pharmacological measures as appropriate and evaluate response  - Consider cultural and social influences on pain and pain management  - Notify physician/advanced practitioner if interventions unsuccessful or patient reports new pain  Outcome: Progressing     Problem: INFECTION - ADULT  Goal: Absence or prevention of progression during hospitalization  Description: INTERVENTIONS:  - Assess and monitor for signs and symptoms of infection  - Monitor lab/diagnostic results  - Monitor all insertion sites, i.e. indwelling lines, tubes, and drains  - Monitor endotracheal if appropriate and nasal secretions for changes in amount and color  - [place] appropriate cooling/warming therapies per order  - Administer medications as ordered  - Instruct and encourage patient and family to use good hand hygiene technique  - Identify and instruct in appropriate isolation precautions for identified infection/condition  Outcome: Progressing  Goal: Absence of fever/infection during neutropenic period  Description: INTERVENTIONS:  - Monitor WBC    Outcome: Progressing

## 2024-04-21 NOTE — PROGRESS NOTES
"Progress Note - General Surgery   Melanie Langford 38 y.o. male MRN: [id number]  Unit/Bed#: -[facility] Encounter: [id number]    Assessment/Plan  Recurrent Diverticulitis at Splenic Flexure with well organized fluid collection   IR aspiration of abscess at splenic flexure, 20 ml of pus extracted  Cultures pending    Tmax 99  WBC 7.6 from 10.98 on admission    -cont Rocephin and Flagyl  -cont surg soft diet  -cont pain control  -anticipate discharge today  -cont to monitor exam, blood work         Chief Complaint: I feel much better, no more fevers, I never had pain with this       Objective/Exam: Blood pressure 131/81, pulse 85, temperature 99 °F (37.2 °C), resp. rate 16, height 6' (1.829 m), weight 99.5 kg (219 lb 5.7 oz), SpO2 98%.    Wound Culure: No results found for: \"WOUNDCULT\"      General Appearance:    Alert and orientated x 3, cooperative, no distress   Lungs:     Clear to auscultation bilaterally, respirations unlabored    Heart:    Regular rate and rhythm   Abdomen:    Soft NT NBS           Extremities:   Extremities normal,  no cyanosis or edema   Pulses:   2+ and symmetric all extremities, no calf tenderness   Skin:   Skin color, texture, turgor normal, no rashes or lesions   Neurologic:   CNII-XII intact, normal strength, sensation and reflexes     Throughout, affect appropriate       ,      Intake/Output Summary (Last 24 hours) at 4/21/2024 0636  Last data filed at 4/20/2024 2101  Gross per 24 hour   Intake 350 ml   Output --   Net 350 ml       Invasive Devices       Peripheral Intravenous Line  Duration             Peripheral IV 04/19/24 Left Antecubital 1 day                                            Labs: CBC with diff: @RESUFAST(WBC,HGB,HCT,MCV,PLT,ADJUSTEDWBC,   RBC,MCH,MCHC,RDW,MPV,NRBC,TOTALCELLSCOUNTED,SEGS%,GRANS%,LYMPHS%,EOS%,BASO%,ABNEUT,ABGRANS,ABLYMPHS,ABMOMOS,ABEOS,ABBASO)@,   BMP/CMP:  Lab Results   Component Value Date    K 4.0 04/21/2024     04/21/2024    CO2 25 04/21/2024    BUN 9 " "04/21/2024    CREATININE 0.85 04/21/2024    CALCIUM 9.0 04/21/2024    AST 22 04/19/2024    ALT 26 04/19/2024    ALKPHOS 89 04/19/2024    EGFR 112 04/21/2024   ,   Lipid Panel: No results found for: \"CHOL\",   Coags:   Lab Results   Component Value Date    PTT 34 04/03/2024    INR 1.11 04/03/2024   ,     Blood Culture:   Lab Results   Component Value Date    BLOODCX Received in Microbiology Lab. Culture in Progress. 04/19/2024   ,   Urinalysis:   Lab Results   Component Value Date    COLORU Colorless 04/19/2024    CLARITYU Clear 04/19/2024    SPECGRAV 1.002 (L) 04/19/2024    PHUR 6.0 04/19/2024    LEUKOCYTESUR Negative 04/19/2024    NITRITE Negative 04/19/2024    GLUCOSEU Negative 04/19/2024    KETONESU Negative 04/19/2024    BILIRUBINUR Negative 04/19/2024    BLOODU Negative 04/19/2024   ,   Urine Culture:   Lab Results   Component Value Date    URINECX <10,000 cfu/ml 02/25/2021   ,         Imaging: CT abdomen pelvis with contrast    Result Date: 4/19/2024  Impression: Organizing fluid and gas collection associated with perforated diverticulitis of the splenic flexure. This may now be amenable to drainage if clinically indicated. The study was marked in EPIC for immediate notification. Workstation performed: [id number]         Romana De La Fuente PA-C  4/21/2024      "

## 2024-04-22 ENCOUNTER — TRANSITIONAL CARE MANAGEMENT (OUTPATIENT)
Dept: FAMILY MEDICINE CLINIC | Facility: CLINIC | Age: 39
End: 2024-04-22

## 2024-04-23 LAB
BACTERIA SPEC BFLD CULT: ABNORMAL
GRAM STN SPEC: ABNORMAL
GRAM STN SPEC: ABNORMAL

## 2024-04-25 LAB
BACTERIA BLD CULT: NORMAL
BACTERIA BLD CULT: NORMAL

## 2024-04-29 ENCOUNTER — OFFICE VISIT (OUTPATIENT)
Dept: GASTROENTEROLOGY | Facility: CLINIC | Age: 39
End: 2024-04-29
Payer: COMMERCIAL

## 2024-04-29 VITALS
HEART RATE: 90 BPM | SYSTOLIC BLOOD PRESSURE: 124 MMHG | DIASTOLIC BLOOD PRESSURE: 80 MMHG | WEIGHT: 218 LBS | TEMPERATURE: 97.5 F | BODY MASS INDEX: 29.53 KG/M2 | HEIGHT: 72 IN | OXYGEN SATURATION: 98 %

## 2024-04-29 DIAGNOSIS — K57.92 ACUTE DIVERTICULITIS: Primary | ICD-10-CM

## 2024-04-29 PROCEDURE — 99203 OFFICE O/P NEW LOW 30 MIN: CPT | Performed by: PHYSICIAN ASSISTANT

## 2024-04-29 NOTE — PROGRESS NOTES
[facility] - Outpatient Consultation  Melanie Langford 38 y.o. male MRN: [id number]  Encounter: [id number]          ASSESSMENT AND PLAN:      1. Acute diverticulitis  2 recent admissions  On 4/19 he was found to have an abscess complicating his diverticulitis  This was drained by IR and thankfully no drain had to be placed  He is finishing his Augmentin course this week    He feels well    Will plan routine colonoscopy in 6-8 weeks  Will have patient schedule a CT prior to ensure complete resolution of the abscess    Once antibiotic course is complete advised patient to follow a high fiber diet and add a fiber supplement  Recommended 25-30gms of fiber daily    Based upon any return in symptoms, results of repeat CT and colonoscopy will determine need for eventual surgical intervention    ______________________________________________________________________    HPI: 38-year-old male with a recent history of diverticulitis complicated by an abscess who presents for consultation.  The patient initially presented to [facility] on April 3 with complaints of abdominal pain.  He was found to have diverticulitis with an acute perforation it and he was admitted for bowel rest and IV antibiotics.  His symptoms quickly improved and he was discharged on April 7 in stable condition.  He finished his antibiotic course.  Unfortunately, although the pain did not return he developed symptoms of fevers and chills with muscle aches.  Because of this he re-presented to [facility] on April 19 with the symptoms.  CT was performed at this time documenting an organizing fluid and gas collection with associated perforated diverticulitis.  IR was consulted who was able to drain the abscess without placing a drain.  He was restarted on antibiotics and discharged 2 days later on April 21.  Since discharge he reports that he is feeling well.  He has no abdominal pain.  He has no infectious symptoms.  He is having  mostly regular bowel movements.  He denies any nausea or vomiting.  He has been continuing to follow a very low roughage diet as advised.  He reports that prior to this month he had 1 other episode of diverticulitis which was much more minor.  He has never had a colonoscopy.      REVIEW OF SYSTEMS:    CONSTITUTIONAL: Denies any fever, chills, rigors, and weight loss.  HEENT: No earache or tinnitus. Denies hearing loss or visual disturbances.  CARDIOVASCULAR: No chest pain or palpitations.   RESPIRATORY: Denies any cough, hemoptysis, shortness of breath or dyspnea on exertion.  GASTROINTESTINAL: As noted in the History of Present Illness.   GENITOURINARY: No problems with urination. Denies any hematuria or dysuria.  NEUROLOGIC: No dizziness or vertigo, denies headaches.   MUSCULOSKELETAL: Denies any muscle or joint pain.   SKIN: Denies skin rashes or itching.   ENDOCRINE: Denies excessive thirst. Denies intolerance to heat or cold.  PSYCHOSOCIAL: Denies depression or anxiety. Denies any recent memory loss.       Historical Information   Past Medical History:   Diagnosis Date    Acute diverticulitis 2024    Anxiety      Past Surgical History:   Procedure Laterality Date    IR DRAINAGE TUBE PLACEMENT  2024     Social History   Social History     Substance and Sexual Activity   Alcohol Use Yes    Comment: rarely     Social History     Substance and Sexual Activity   Drug Use Not Currently     Social History     Tobacco Use   Smoking Status Former    Current packs/day: 0.00    Average packs/day: 0.3 packs/day for 8.0 years (2.0 ttl pk-yrs)    Types: Cigarettes    Quit date: 9/3/2022    Years since quittin.6    Passive exposure: Past   Smokeless Tobacco Never     Family History   Problem Relation Age of Onset    Hypertension Father        Meds/Allergies       Current Outpatient Medications:     amoxicillin-clavulanate (AUGMENTIN) 875-125 mg per tablet    No Known Allergies        Objective     Blood  pressure 124/80, pulse 90, temperature 97.5 °F (36.4 °C), temperature source Temporal, height 6' (1.829 m), weight 98.9 kg (218 lb), SpO2 98%. Body mass index is 29.57 kg/m².        PHYSICAL EXAM:      General Appearance:   Alert, cooperative, no distress   HEENT:   Normocephalic, atraumatic, anicteric.     Neck:  Supple, symmetrical, trachea midline   Lungs:   Clear to auscultation bilaterally; no rales, rhonchi or wheezing; respirations unlabored    Heart::   Regular rate and rhythm; no murmur, rub, or gallop.   Abdomen:   Soft, non-tender, non-distended; normal bowel sounds; no masses, no organomegaly    Genitalia:   Deferred    Rectal:   Deferred    Extremities:  No cyanosis, clubbing or edema    Pulses:  2+ and symmetric    Skin:  No jaundice, rashes, or lesions    Lymph nodes:  No palpable cervical lymphadenopathy        Lab Results:   No visits with results within 1 Day(s) from this visit.   Latest known visit with results is:   Admission on 04/19/2024, Discharged on 04/21/2024   Component Date Value    WBC 04/19/2024 19.05 (H)     RBC 04/19/2024 4.81     Hemoglobin 04/19/2024 13.9     Hematocrit 04/19/2024 42.9     MCV 04/19/2024 89     MCH 04/19/2024 28.9     MCHC 04/19/2024 32.4     RDW 04/19/2024 12.2     MPV 04/19/2024 9.9     Platelets 04/19/2024 440 (H)     nRBC 04/19/2024 0     Segmented % 04/19/2024 82 (H)     Immature Grans % 04/19/2024 1     Lymphocytes % 04/19/2024 11 (L)     Monocytes % 04/19/2024 6     Eosinophils Relative 04/19/2024 0     Basophils Relative 04/19/2024 0     Absolute Neutrophils 04/19/2024 15.63 (H)     Absolute Immature Grans 04/19/2024 0.09     Absolute Lymphocytes 04/19/2024 2.01     Absolute Monocytes 04/19/2024 1.18     Eosinophils Absolute 04/19/2024 0.06     Basophils Absolute 04/19/2024 0.08     Sodium 04/19/2024 137     Potassium 04/19/2024 4.3     Chloride 04/19/2024 102     CO2 04/19/2024 25     ANION GAP 04/19/2024 10     BUN 04/19/2024 8     Creatinine 04/19/2024  0.90     Glucose 04/19/2024 97     Calcium 04/19/2024 9.4     AST 04/19/2024 22     ALT 04/19/2024 26     Alkaline Phosphatase 04/19/2024 89     Total Protein 04/19/2024 8.3     Albumin 04/19/2024 4.3     Total Bilirubin 04/19/2024 0.59     eGFR 04/19/2024 109     LACTIC ACID 04/19/2024 1.5     Color, UA 04/19/2024 Colorless     Clarity, UA 04/19/2024 Clear     Specific Gravity, UA 04/19/2024 1.002 (L)     pH, UA 04/19/2024 6.0     Leukocytes, UA 04/19/2024 Negative     Nitrite, UA 04/19/2024 Negative     Protein, UA 04/19/2024 Negative     Glucose, UA 04/19/2024 Negative     Ketones, UA 04/19/2024 Negative     Urobilinogen, UA 04/19/2024 <2.0     Bilirubin, UA 04/19/2024 Negative     Occult Blood, UA 04/19/2024 Negative     SARS-CoV-2 04/19/2024 Negative     INFLUENZA A PCR 04/19/2024 Negative     INFLUENZA B PCR 04/19/2024 Negative     RSV PCR 04/19/2024 Negative     Blood Culture 04/19/2024 No Growth After 5 Days.     Blood Culture 04/19/2024 No Growth After 5 Days.     Platelets 04/19/2024 423 (H)     MPV 04/19/2024 10.1     WBC 04/20/2024 10.98 (H)     RBC 04/20/2024 4.56     Hemoglobin 04/20/2024 13.3     Hematocrit 04/20/2024 40.7     MCV 04/20/2024 89     MCH 04/20/2024 29.2     MCHC 04/20/2024 32.7     RDW 04/20/2024 12.3     MPV 04/20/2024 9.8     Platelets 04/20/2024 371     nRBC 04/20/2024 0     Segmented % 04/20/2024 74     Immature Grans % 04/20/2024 0     Lymphocytes % 04/20/2024 15     Monocytes % 04/20/2024 9     Eosinophils Relative 04/20/2024 1     Basophils Relative 04/20/2024 1     Absolute Neutrophils 04/20/2024 8.16 (H)     Absolute Immature Grans 04/20/2024 0.04     Absolute Lymphocytes 04/20/2024 1.64     Absolute Monocytes 04/20/2024 0.96     Eosinophils Absolute 04/20/2024 0.10     Basophils Absolute 04/20/2024 0.08     Sodium 04/20/2024 137     Potassium 04/20/2024 3.9     Chloride 04/20/2024 104     CO2 04/20/2024 24     ANION GAP 04/20/2024 9     BUN 04/20/2024 8     Creatinine  04/20/2024 0.84     Glucose 04/20/2024 111     Calcium 04/20/2024 9.0     eGFR 04/20/2024 112     Body Fluid Culture, Ster* 04/20/2024 3+ Growth of Escherichia coli (A)     Body Fluid Culture, Ster* 04/20/2024 4+ Growth of Beta Hemolytic Streptococcus Group C (A)     Body Fluid Culture, Ster* 04/20/2024 Few Colonies of Candida albicans (A)     Gram Stain Result 04/20/2024 4+ Polys     Gram Stain Result 04/20/2024 No bacteria seen     WBC 04/21/2024 7.69     RBC 04/21/2024 4.47     Hemoglobin 04/21/2024 13.0     Hematocrit 04/21/2024 39.9     MCV 04/21/2024 89     MCH 04/21/2024 29.1     MCHC 04/21/2024 32.6     RDW 04/21/2024 12.0     MPV 04/21/2024 10.1     Platelets 04/21/2024 403 (H)     nRBC 04/21/2024 0     Segmented % 04/21/2024 64     Immature Grans % 04/21/2024 0     Lymphocytes % 04/21/2024 25     Monocytes % 04/21/2024 8     Eosinophils Relative 04/21/2024 2     Basophils Relative 04/21/2024 1     Absolute Neutrophils 04/21/2024 4.98     Absolute Immature Grans 04/21/2024 0.03     Absolute Lymphocytes 04/21/2024 1.89     Absolute Monocytes 04/21/2024 0.58     Eosinophils Absolute 04/21/2024 0.16     Basophils Absolute 04/21/2024 0.05     Sodium 04/21/2024 138     Potassium 04/21/2024 4.0     Chloride 04/21/2024 104     CO2 04/21/2024 25     ANION GAP 04/21/2024 9     BUN 04/21/2024 9     Creatinine 04/21/2024 0.85     Glucose 04/21/2024 98     Calcium 04/21/2024 9.0     eGFR 04/21/2024 112          Radiology Results:   IR drainage tube placement    Result Date: 4/22/2024  Narrative: PROCEDURE: CT and ultrasound-guided perisplenic aspiration STAFF: Latrell Tomlin M.D. CONTRAST: None. DOSE LENGTH PRODUCT: 443.94 mGy-cm. This examination, like all CT scans performed in the [facility], was performed utilizing techniques to minimize radiation dose exposure, including the use of iterative reconstruction and automated exposure control. NUMBER OF IMAGES: Multiple. COMPLICATIONS: None.  MEDICATIONS: Antibiotics: The patient was already receiving antibiotics, with a current dose. Moderate sedation was monitored by radiology nursing staff.  Monitoring of conscious sedation totaled 15 minutes. See nursing records. INDICATION: Perisplenic abscess. At the request of the surgical service, aspiration has been requested. They specifically requested not to place a drain. PROCEDURE: Using intermittent CT guidance, a needle was inserted into the collection. 20 mL of pus was removed and sent for culture and sensitivity.  The needle was removed. FINDINGS: 1.  Pre-procedural CT showed a mixed attenuation collection collection abutting the spleen. 2.  Real-time ultrasound showed good positioning of the needle within the collection and outside of the spleen. 3.  Post-procedural CT showed near complete resolution of the collection.     Impression: Image-guided perisplenic aspiration. Workstation performed: [id number]     CT abdomen pelvis with contrast    Result Date: 4/19/2024  Narrative: CT ABDOMEN AND PELVIS WITH IV CONTRAST INDICATION:   lower abd pain, fever COMPARISON: CT abdomen/pelvis 4/3/2024. TECHNIQUE:  CT examination of the abdomen and pelvis was performed. Axial, sagittal, and coronal 2D reformatted images were created from the source data and submitted for interpretation. Radiation dose length product (DLP) for this visit:  845 mGy-cm .  This examination, like all CT scans performed in the [facility], was performed utilizing techniques to minimize radiation dose exposure, including the use of iterative reconstruction and automated exposure control. IV Contrast:  100 mL of iohexol (OMNIPAQUE) Enteric Contrast: None. FINDINGS: ABDOMEN LOWER CHEST: Trace left pleural effusion with left basilar atelectasis. LIVER: Mild diffuse hepatic steatosis. No suspicious lesions. BILIARY: No intrahepatic biliary ductal dilatation. Normal caliber common bile duct. GALLBLADDER: No calcified  gallstones. Normal wall thickness. No pericholecystic inflammatory changes. SPLEEN: Within normal limits. No suspicious lesion. Normal spleen size. PANCREAS: Pancreatic parenchyma is within normal limits. No main pancreatic ductal dilatation. No pancreatic/peripancreatic inflammation. ADRENAL GLANDS: Normal right adrenal gland. No change in an indeterminate 2.6 cm left adrenal nodule with recommendations per prior study. KIDNEYS/URETERS: Normal size and position. Symmetric enhancement. No suspicious lesion. No calcified stones or hydronephrosis. Ureters within normal limits. STOMACH AND BOWEL: Stomach is grossly within normal limits. Normal caliber small bowel. Redemonstration of acute diverticulitis of the splenic flexure and proximal descending colon. There is a more organized-appearing fluid and gas collection in the left  paracolic gutter interposed between the left kidney, the colon, and the spleen, which measures approximately 5.2 x 2.8 x 12.6 cm (series 2/62, 602/61). Portions of the collection are rim-enhancing. There is some additional nonloculated fluid posterior to the spleen. APPENDIX: Normal air-filled appendix. ABDOMINOPELVIC CAVITY: No ascites. No intraperitoneal collection. No intraperitoneal free air. No lymphadenopathy. No retroperitoneal hematoma. Left retroperitoneal collection as described above. VESSELS: Normal caliber abdominal aorta with no detectable atherosclerotic plaque. The celiac, SMA, and LAURA are patent. The main, right, and left portal veins are patent. The SMV and splenic vein are patent. The hepatic veins are patent. PELVIS REPRODUCTIVE ORGANS: Within normal limits for patient's age. URINARY BLADDER: Within normal limits. No calculi. ABDOMINAL WALL/INGUINAL REGIONS: Tiny left-sided fat-containing inguinal hernia. BONES: Vertebral body height is maintained. No acute fracture or destructive osseous lesion.     Impression: Organizing fluid and gas collection associated with perforated  diverticulitis of the splenic flexure. This may now be amenable to drainage if clinically indicated. The study was marked in EPIC for immediate notification. Workstation performed: [id number]     XR chest portable    Result Date: 4/4/2024  Narrative: XR CHEST PORTABLE INDICATION: Cardiac work-up. COMPARISON: None FINDINGS: Clear lungs. No pneumothorax or pleural effusion. Normal cardiomediastinal silhouette. Bones are unremarkable for age. Normal upper abdomen.     Impression: No acute cardiopulmonary disease. Workstation performed: [id number]     CT abdomen pelvis with contrast    Result Date: 4/3/2024  Narrative: CT ABDOMEN AND PELVIS WITH IV CONTRAST INDICATION: llq pain. COMPARISON: None. TECHNIQUE: CT examination of the abdomen and pelvis was performed. Multiplanar 2D reformatted images were created from the source data. This examination, like all CT scans performed in the [facility], was performed utilizing techniques to minimize radiation dose exposure, including the use of iterative reconstruction and automated exposure control. Radiation dose length product (DLP) for this visit: 1074 mGy-cm IV Contrast: 100 mL of iohexol (OMNIPAQUE) Enteric Contrast: Not administered. FINDINGS: ABDOMEN LOWER CHEST: Nonspecific subpleural 3 mm groundglass nodule in the right middle lobe, no routine follow-up imaging recommended per current Fleischner Society guidelines. No clinically significant abnormality in the visualized lower chest. No pleural or pericardial effusions. LIVER/BILIARY TREE: Mild diffuse hepatic steatosis. No suspicious mass. Normal hepatic contours. No biliary dilation. GALLBLADDER: No calcified gallstones. No pericholecystic inflammatory change. SPLEEN: Unremarkable. PANCREAS: Unremarkable. ADRENAL GLANDS: Right adrenal gland is unremarkable. Indeterminate 2.6 x 1.9 cm left adrenal intermediate density nodule. Consider nonemergent adrenal washout CT or chemical shift MRI for further  characterization. KIDNEYS/URETERS: Unremarkable. No hydronephrosis. STOMACH AND BOWEL: Stomach is mildly distended with air and fluid. No intraluminal mass or irregular wall thickening. The small bowel loops are normal in course and caliber without obstruction or inflammation. Terminal ileum and appendix are grossly unremarkable. There is abnormal wall thickening with prominent surrounding mesenteric inflammatory stranding involving the splenic flexure and descending colon with multiple colonic diverticuli in this region seen. Abnormal inflammatory debris extends inferiorly along the retroperitoneum into the left lower quadrant and left pelvic region. Several foci of free air adjacent to the posterior wall of the proximal descending colon in the region of a large inflamed diverticula noted. No drainable organized  rim-enhancing fluid collection/abscess. Overall findings are consistent with acute perforated diverticulitis of the distal splenic flexure and proximal descending colon. APPENDIX: Normal. ABDOMINOPELVIC CAVITY: No ascites or loculated fluid collection. There is pneumoperitoneum adjacent to the posterior wall of the proximal descending colon. No lymphadenopathy by size criteria. Multiple nonspecific subcentimeter mesenteric lymph nodes are  seen predominantly in the left abdomen and pelvis, likely reactive in etiology. VESSELS: Unremarkable for patient's age. PELVIS REPRODUCTIVE ORGANS: Unremarkable for patient's age. URINARY BLADDER: Mildly distended and grossly unremarkable. ABDOMINAL WALL/INGUINAL REGIONS: Unremarkable. BONES: No acute fracture or suspicious osseous lesion.     Impression: 1.  Acute perforated diverticulitis of the splenic flexure/proximal descending colon as detailed above. Recommend surgical consultation. 2.  Mild diffuse hepatic steatosis. 3.  Indeterminate 2.6 x 1.9 cm left adrenal intermediate density nodule. Consider nonemergent adrenal washout CT or chemical shift MRI for further  characterization. Findings discussed with JULIAN Gay at 11:54 p.m. Workstation performed: [id number]   Answers submitted by the patient for this visit:  Abdominal Pain Questionnaire (Submitted on 4/29/2024)  Chief Complaint: Abdominal pain  Chronicity: new  Onset: 1 to 4 weeks ago  Onset quality: gradual  Frequency: rarely  Episode duration: 2 Days  Progression since onset: resolved  Pain location: LLQ  Pain - numeric: 0/10  Pain quality: aching  Radiates to: does not radiate  anorexia: No  arthralgias: No  belching: No  constipation: No  diarrhea: No  dysuria: No  fever: No  flatus: No  frequency: No  headaches: No  hematochezia: No  hematuria: No  melena: No  myalgias: No  nausea: No  weight loss: No  vomiting: No  Aggravated by: nothing  Relieved by: nothing  Diagnostic workup: CT scan, ultrasound

## 2024-04-29 NOTE — PATIENT INSTRUCTIONS
Scheduled date of colonoscopy (as of today):6/18/24  Physician performing colonoscopy:Bala  Location of colonoscopy:[place]  Bowel prep reviewed with patient:Jordana/Miralax  Instructions reviewed with patient by:Klever quinn  Clearances:  none

## 2024-05-09 ENCOUNTER — OFFICE VISIT (OUTPATIENT)
Dept: FAMILY MEDICINE CLINIC | Facility: CLINIC | Age: 39
End: 2024-05-09
Payer: COMMERCIAL

## 2024-05-09 VITALS
HEIGHT: 72 IN | SYSTOLIC BLOOD PRESSURE: 150 MMHG | OXYGEN SATURATION: 97 % | TEMPERATURE: 98.6 F | DIASTOLIC BLOOD PRESSURE: 92 MMHG | WEIGHT: 225 LBS | BODY MASS INDEX: 30.48 KG/M2 | HEART RATE: 90 BPM

## 2024-05-09 DIAGNOSIS — R03.0 ELEVATED BP WITHOUT DIAGNOSIS OF HYPERTENSION: Primary | ICD-10-CM

## 2024-05-09 PROCEDURE — 99213 OFFICE O/P EST LOW 20 MIN: CPT

## 2024-05-09 NOTE — PROGRESS NOTES
Name: Melanie Langford      : 1985      MRN: [id number]  Encounter Provider: Farida Croft PA-C  Encounter Date: 2024   Encounter department: [facility]    Assessment & Plan     1. Elevated BP without diagnosis of hypertension    Patient presents for four week BP check. He has not been taking BP at home.   BP still elevated today at 150/92 mmHg. However his last visit with general surgery and GI showed his BP to be WNL.   He believes today's elevated reading is directly related to his high level of stress today and rushing to get to this appointment. Therefore he'd like to start monitoring it at home and send me a log in two weeks.   If log reveals consistent elevation above goal we will consider BP medication.   Otherwise follow up in three months or sooner as needed.        Subjective      CC: BP check     Patient presents for BP check; at his apt to establish last month his BP was elevated. He has not been taking it at home however at his last few appointments with general surgery and GI it has been normal (120s-130s/80s).   Patient notes he knows his BP is going to be high today because he was stressed all morning and rushed here.  Patient did end up back in the ED and admitted from - due to reoccurring diverticulitis which caused an abscess. Abscess was drained via IR and he was put on Augmentin; notes he is back to normal and feeling great. Saw GI outpatient on ; getting repeat CT scan on  and colonoscopy in 6 weeks.         Review of Systems   Constitutional:  Negative for appetite change, chills, diaphoresis and fever.   Eyes:  Negative for visual disturbance.   Respiratory:  Negative for cough, chest tightness, shortness of breath and wheezing.    Cardiovascular:  Negative for chest pain, palpitations and leg swelling.   Gastrointestinal:  Negative for abdominal pain, blood in stool, constipation, diarrhea, nausea and vomiting.   Neurological:  Negative for  dizziness, light-headedness and headaches.       No current outpatient medications on file prior to visit.       Objective     /92   Pulse 90   Temp 98.6 °F (37 °C)   Ht 6' (1.829 m)   Wt 102 kg (225 lb)   SpO2 97%   BMI 30.52 kg/m²     Physical Exam  Vitals reviewed.   Constitutional:       General: He is not in acute distress.     Appearance: Normal appearance. He is not ill-appearing or diaphoretic.   HENT:      Head: Normocephalic and atraumatic.      Right Ear: External ear normal.      Left Ear: External ear normal.      Nose: Nose normal.      Mouth/Throat:      Mouth: Mucous membranes are moist.   Eyes:      General:         Right eye: No discharge.         Left eye: No discharge.      Conjunctiva/sclera: Conjunctivae normal.   Cardiovascular:      Rate and Rhythm: Normal rate and regular rhythm.      Pulses: Normal pulses.      Heart sounds: Normal heart sounds. No murmur heard.  Pulmonary:      Effort: Pulmonary effort is normal. No respiratory distress.      Breath sounds: Normal breath sounds. No wheezing, rhonchi or rales.   Musculoskeletal:         General: Normal range of motion.      Cervical back: Normal range of motion.      Right lower leg: No edema.      Left lower leg: No edema.   Skin:     General: Skin is warm.   Neurological:      General: No focal deficit present.      Mental Status: He is alert.      Gait: Gait normal.   Psychiatric:         Mood and Affect: Mood normal.       Farida Croft PA-C

## 2024-05-15 ENCOUNTER — APPOINTMENT (OUTPATIENT)
Dept: LAB | Facility: HOSPITAL | Age: 39
End: 2024-05-15
Payer: COMMERCIAL

## 2024-05-15 ENCOUNTER — TELEPHONE (OUTPATIENT)
Age: 39
End: 2024-05-15

## 2024-05-15 ENCOUNTER — HOSPITAL ENCOUNTER (OUTPATIENT)
Dept: CT IMAGING | Facility: HOSPITAL | Age: 39
Discharge: HOME | End: 2024-05-15
Payer: COMMERCIAL

## 2024-05-15 ENCOUNTER — TELEPHONE (OUTPATIENT)
Dept: FAMILY MEDICINE CLINIC | Facility: CLINIC | Age: 39
End: 2024-05-15

## 2024-05-15 DIAGNOSIS — K92.1 HEMATOCHEZIA: ICD-10-CM

## 2024-05-15 DIAGNOSIS — R10.32 LLQ PAIN: ICD-10-CM

## 2024-05-15 DIAGNOSIS — Z87.19 HX OF DIVERTICULITIS OF COLON: ICD-10-CM

## 2024-05-15 DIAGNOSIS — K57.92 ACUTE DIVERTICULITIS: Primary | ICD-10-CM

## 2024-05-15 DIAGNOSIS — Z87.19 HX OF DIVERTICULITIS OF COLON: Primary | ICD-10-CM

## 2024-05-15 LAB
ALBUMIN SERPL BCP-MCNC: 4.2 G/DL (ref 3.5–5)
ALP SERPL-CCNC: 111 U/L (ref 34–104)
ALT SERPL W P-5'-P-CCNC: 21 U/L (ref 7–52)
ANION GAP SERPL CALCULATED.3IONS-SCNC: 8 MMOL/L (ref 4–13)
AST SERPL W P-5'-P-CCNC: 11 U/L (ref 13–39)
BASOPHILS # BLD AUTO: 0.08 THOUSANDS/ÂΜL (ref 0–0.1)
BASOPHILS NFR BLD AUTO: 1 % (ref 0–1)
BILIRUB SERPL-MCNC: 1.16 MG/DL (ref 0.2–1)
BUN SERPL-MCNC: 11 MG/DL (ref 5–25)
CALCIUM SERPL-MCNC: 9.2 MG/DL (ref 8.4–10.2)
CHLORIDE SERPL-SCNC: 103 MMOL/L (ref 96–108)
CO2 SERPL-SCNC: 26 MMOL/L (ref 21–32)
CREAT SERPL-MCNC: 0.88 MG/DL (ref 0.6–1.3)
EOSINOPHIL # BLD AUTO: 0.15 THOUSAND/ÂΜL (ref 0–0.61)
EOSINOPHIL NFR BLD AUTO: 1 % (ref 0–6)
ERYTHROCYTE [DISTWIDTH] IN BLOOD BY AUTOMATED COUNT: 12.9 % (ref 11.6–15.1)
GFR SERPL CREATININE-BSD FRML MDRD: 110 ML/MIN/1.73SQ M
GLUCOSE P FAST SERPL-MCNC: 95 MG/DL (ref 65–99)
HCT VFR BLD AUTO: 43.8 % (ref 36.5–49.3)
HGB BLD-MCNC: 14.7 G/DL (ref 12–17)
IMM GRANULOCYTES # BLD AUTO: 0.04 THOUSAND/UL (ref 0–0.2)
IMM GRANULOCYTES NFR BLD AUTO: 0 % (ref 0–2)
LYMPHOCYTES # BLD AUTO: 3.34 THOUSANDS/ÂΜL (ref 0.6–4.47)
LYMPHOCYTES NFR BLD AUTO: 23 % (ref 14–44)
MCH RBC QN AUTO: 29.1 PG (ref 26.8–34.3)
MCHC RBC AUTO-ENTMCNC: 33.6 G/DL (ref 31.4–37.4)
MCV RBC AUTO: 87 FL (ref 82–98)
MONOCYTES # BLD AUTO: 0.99 THOUSAND/ÂΜL (ref 0.17–1.22)
MONOCYTES NFR BLD AUTO: 7 % (ref 4–12)
NEUTROPHILS # BLD AUTO: 9.92 THOUSANDS/ÂΜL (ref 1.85–7.62)
NEUTS SEG NFR BLD AUTO: 68 % (ref 43–75)
NRBC BLD AUTO-RTO: 0 /100 WBCS
PLATELET # BLD AUTO: 258 THOUSANDS/UL (ref 149–390)
PMV BLD AUTO: 10.3 FL (ref 8.9–12.7)
POTASSIUM SERPL-SCNC: 3.9 MMOL/L (ref 3.5–5.3)
PROT SERPL-MCNC: 7.9 G/DL (ref 6.4–8.4)
RBC # BLD AUTO: 5.05 MILLION/UL (ref 3.88–5.62)
SODIUM SERPL-SCNC: 137 MMOL/L (ref 135–147)
WBC # BLD AUTO: 14.52 THOUSAND/UL (ref 4.31–10.16)

## 2024-05-15 PROCEDURE — 85025 COMPLETE CBC W/AUTO DIFF WBC: CPT

## 2024-05-15 PROCEDURE — 74177 CT ABD & PELVIS W/CONTRAST: CPT

## 2024-05-15 PROCEDURE — 36415 COLL VENOUS BLD VENIPUNCTURE: CPT

## 2024-05-15 PROCEDURE — 80053 COMPREHEN METABOLIC PANEL: CPT

## 2024-05-15 RX ORDER — METRONIDAZOLE 500 MG/1
500 TABLET ORAL EVERY 8 HOURS SCHEDULED
Qty: 30 TABLET | Refills: 0 | Status: SHIPPED | OUTPATIENT
Start: 2024-05-15 | End: 2024-05-25

## 2024-05-15 RX ORDER — CIPROFLOXACIN 500 MG/1
500 TABLET, FILM COATED ORAL EVERY 12 HOURS SCHEDULED
Qty: 20 TABLET | Refills: 0 | Status: SHIPPED | OUTPATIENT
Start: 2024-05-15 | End: 2024-05-16

## 2024-05-15 RX ADMIN — IOHEXOL 100 ML: 350 INJECTION, SOLUTION INTRAVENOUS at 14:04

## 2024-05-15 NOTE — TELEPHONE ENCOUNTER
Pt called to get appt.  He has a flare up of diverticulitis (constipation, stomach cramps, and blood in stools).  No appt with you available today.  I can lillian appt tomorrow, but pt wondering if you could call in script to his pharmacy today.  Advise.

## 2024-05-15 NOTE — TELEPHONE ENCOUNTER
Patient said that it is ok to order. He did note that this seems different than last time, it is better safe than sorry.  Please order

## 2024-05-15 NOTE — TELEPHONE ENCOUNTER
Ordered stat CT, please schedule. Also ordered stat CBC/CMP given blood in stool. How much blood in stool? If enough to fill toilet bowl would recommend ER.

## 2024-05-15 NOTE — TELEPHONE ENCOUNTER
Patient called, to schedule appt with LIZ Croft, for today he has a flare up of Diverticulitis, started yesterday, small amount of blood in stool this morning, no fever, some  abd cramping. Transferred to office. Where they were able to handle the call.

## 2024-05-15 NOTE — TELEPHONE ENCOUNTER
Patient reports minimal blood this morning and only 1 occurrence. He is scheduled for today at 230 for his CT scan.

## 2024-05-15 NOTE — TELEPHONE ENCOUNTER
Given his history I would recommend a stat CT scan of abdomen to make sure he doesn't have another abscess or perforation. If he is agreeable I will order the scan and we can set it up for him today, after I receive imaging I can start him on antibiotic treatment,

## 2024-05-16 DIAGNOSIS — K57.92 ACUTE DIVERTICULITIS: Primary | ICD-10-CM

## 2024-05-16 RX ORDER — SULFAMETHOXAZOLE AND TRIMETHOPRIM 800; 160 MG/1; MG/1
1 TABLET ORAL EVERY 12 HOURS SCHEDULED
Qty: 20 TABLET | Refills: 0 | Status: SHIPPED | OUTPATIENT
Start: 2024-05-16 | End: 2024-05-26

## 2024-05-30 ENCOUNTER — TELEPHONE (OUTPATIENT)
Age: 39
End: 2024-05-30

## 2024-06-20 ENCOUNTER — HOSPITAL ENCOUNTER (OUTPATIENT)
Dept: CT IMAGING | Facility: HOSPITAL | Age: 39
End: 2024-06-20
Payer: COMMERCIAL

## 2024-06-20 DIAGNOSIS — K57.92 ACUTE DIVERTICULITIS: ICD-10-CM

## 2024-06-20 PROCEDURE — 74177 CT ABD & PELVIS W/CONTRAST: CPT

## 2024-06-20 RX ADMIN — IOHEXOL 100 ML: 350 INJECTION, SOLUTION INTRAVENOUS at 15:56

## 2024-06-27 ENCOUNTER — HOSPITAL ENCOUNTER (OUTPATIENT)
Dept: GASTROENTEROLOGY | Facility: HOSPITAL | Age: 39
Setting detail: OUTPATIENT SURGERY
End: 2024-06-27
Payer: COMMERCIAL

## 2024-06-27 ENCOUNTER — ANESTHESIA (OUTPATIENT)
Dept: GASTROENTEROLOGY | Facility: HOSPITAL | Age: 39
End: 2024-06-27

## 2024-06-27 ENCOUNTER — ANESTHESIA EVENT (OUTPATIENT)
Dept: GASTROENTEROLOGY | Facility: HOSPITAL | Age: 39
End: 2024-06-27

## 2024-06-27 VITALS
TEMPERATURE: 97.7 F | SYSTOLIC BLOOD PRESSURE: 119 MMHG | DIASTOLIC BLOOD PRESSURE: 68 MMHG | HEART RATE: 74 BPM | WEIGHT: 212.52 LBS | RESPIRATION RATE: 22 BRPM | HEIGHT: 72 IN | BODY MASS INDEX: 28.79 KG/M2 | OXYGEN SATURATION: 98 %

## 2024-06-27 DIAGNOSIS — K57.92 ACUTE DIVERTICULITIS: ICD-10-CM

## 2024-06-27 PROCEDURE — 45378 DIAGNOSTIC COLONOSCOPY: CPT | Performed by: INTERNAL MEDICINE

## 2024-06-27 RX ORDER — SODIUM CHLORIDE, SODIUM LACTATE, POTASSIUM CHLORIDE, CALCIUM CHLORIDE 600; 310; 30; 20 MG/100ML; MG/100ML; MG/100ML; MG/100ML
125 INJECTION, SOLUTION INTRAVENOUS CONTINUOUS
Status: DISCONTINUED | OUTPATIENT
Start: 2024-06-27 | End: 2024-07-01 | Stop reason: HOSPADM

## 2024-06-27 RX ORDER — LIDOCAINE HYDROCHLORIDE 10 MG/ML
INJECTION, SOLUTION EPIDURAL; INFILTRATION; INTRACAUDAL; PERINEURAL AS NEEDED
Status: DISCONTINUED | OUTPATIENT
Start: 2024-06-27 | End: 2024-06-27

## 2024-06-27 RX ORDER — PROPOFOL 10 MG/ML
INJECTION, EMULSION INTRAVENOUS AS NEEDED
Status: DISCONTINUED | OUTPATIENT
Start: 2024-06-27 | End: 2024-06-27

## 2024-06-27 RX ADMIN — SODIUM CHLORIDE, SODIUM LACTATE, POTASSIUM CHLORIDE, AND CALCIUM CHLORIDE 125 ML/HR: .6; .31; .03; .02 INJECTION, SOLUTION INTRAVENOUS at 08:58

## 2024-06-27 RX ADMIN — PROPOFOL 20 MG: 10 INJECTION, EMULSION INTRAVENOUS at 09:59

## 2024-06-27 RX ADMIN — PROPOFOL 40 MG: 10 INJECTION, EMULSION INTRAVENOUS at 09:51

## 2024-06-27 RX ADMIN — LIDOCAINE HYDROCHLORIDE 20 MG: 10 INJECTION, SOLUTION EPIDURAL; INFILTRATION; INTRACAUDAL; PERINEURAL at 09:49

## 2024-06-27 RX ADMIN — PROPOFOL 40 MG: 10 INJECTION, EMULSION INTRAVENOUS at 09:55

## 2024-06-27 RX ADMIN — PROPOFOL 40 MG: 10 INJECTION, EMULSION INTRAVENOUS at 09:57

## 2024-06-27 RX ADMIN — PROPOFOL 40 MG: 10 INJECTION, EMULSION INTRAVENOUS at 09:53

## 2024-06-27 RX ADMIN — PROPOFOL 140 MG: 10 INJECTION, EMULSION INTRAVENOUS at 09:49

## 2024-06-27 NOTE — H&P
History and Physical -  [facility]  Melanie Langford 38 y.o. male MRN: [id number]      HPI: Melanie Langford is a 38 y.o. year old male who presents for evaluation of a recent episode of diverticulitis      REVIEW OF SYSTEMS: Per the HPI, and otherwise unremarkable.    Historical Information   Past Medical History:   Diagnosis Date    Acute diverticulitis 2024    Anxiety      Past Surgical History:   Procedure Laterality Date    IR DRAINAGE TUBE PLACEMENT  2024     Social History   Social History     Substance and Sexual Activity   Alcohol Use Yes    Comment: rarely     Social History     Substance and Sexual Activity   Drug Use Not Currently     Social History     Tobacco Use   Smoking Status Former    Current packs/day: 0.00    Average packs/day: 0.3 packs/day for 8.0 years (2.0 ttl pk-yrs)    Types: Cigarettes    Quit date: 9/3/2022    Years since quittin.8    Passive exposure: Past   Smokeless Tobacco Never     Family History   Problem Relation Age of Onset    Hypertension Father        Meds/Allergies     Not in a hospital admission.    No Known Allergies    Objective     Blood pressure 140/81, pulse 91, temperature 98 °F (36.7 °C), temperature source Tympanic, resp. rate 16, height 6' (1.829 m), weight 96.4 kg (212 lb 8.4 oz), SpO2 99%.      PHYSICAL EXAM    Gen: NAD  CV: RRR  CHEST: Clear  ABD: soft, NT/ND  EXT: no edema      ASSESSMENT/PLAN:  This is a 38 y.o. year old male here for colonoscopy, and he is stable and optimized for his procedure.

## 2024-06-27 NOTE — ANESTHESIA PREPROCEDURE EVALUATION
Procedure:  COLONOSCOPY    Relevant Problems   ANESTHESIA (within normal limits)      CARDIO (within normal limits)   (-) MOORE (dyspnea on exertion)      ENDO (within normal limits)      GI/HEPATIC (within normal limits)  NPO confirmed, prep finished ~0400  BMI 28.8      /RENAL (within normal limits)      PULMONARY (within normal limits)   (-) Sleep apnea   (-) URI (upper respiratory infection)      No Known Allergies  Social History     Tobacco Use    Smoking status: Former     Current packs/day: 0.00     Average packs/day: 0.3 packs/day for 8.0 years (2.0 ttl pk-yrs)     Types: Cigarettes     Quit date: 9/3/2022     Years since quittin.8     Passive exposure: Past    Smokeless tobacco: Never   Vaping Use    Vaping status: Never Used   Substance Use Topics    Alcohol use: Yes     Comment: rarely    Drug use: Not Currently     No current outpatient medications  Lab Results   Component Value Date    WBC 14.52 (H) 05/15/2024    HGB 14.7 05/15/2024    HCT 43.8 05/15/2024     05/15/2024    SODIUM 137 05/15/2024    K 3.9 05/15/2024     05/15/2024    CO2 26 05/15/2024    BUN 11 05/15/2024    CREATININE 0.88 05/15/2024    GLUC 98 2024    AST 11 (L) 05/15/2024    ALT 21 05/15/2024    ALKPHOS 111 (H) 05/15/2024    TBILI 1.16 (H) 05/15/2024    ALB 4.2 05/15/2024    PROTIME 15.0 (H) 2024    PTT 34 2024    INR 1.11 2024     Vitals:    24 0851   BP: 140/81   Pulse: 91   Resp: 16   Temp: 98 °F (36.7 °C)   SpO2: 99%     EKG 4/3/24  Sinus tachycardia  Rightward axis  Cannot rule out Inferior infarct , age undetermined  No previous ECGs available  Confirmed by Latia Dumas ([id number]) on 2024 3:57:10 PM    24- SR, rate 70-80s in preop      Physical Exam    Airway    Mallampati score: I  TM Distance: >3 FB  Neck ROM: full     Dental   Comment: Denies loose/chipped teeth, No notable dental hx     Cardiovascular  Rhythm: regular, Rate: normal, Cardiovascular exam  normal    Pulmonary  Pulmonary exam normal Breath sounds clear to auscultation    Other Findings        Anesthesia Plan  ASA Score- 1     Anesthesia Type- IV sedation with anesthesia with ASA Monitors.         Additional Monitors:     Airway Plan:     Comment: O2 mask, natural airway, EtCO2 monitor. Risks discussed including awareness, aspiration, drug reactions and conversion to GA..       Plan Factors-Exercise tolerance (METS): >4 METS.    Chart reviewed. EKG reviewed.  Existing labs reviewed. Patient summary reviewed.    Patient is not a current smoker.              Induction- intravenous.    Postoperative Plan-     Perioperative Resuscitation Plan - Level 1 - Full Code.       Informed Consent- Anesthetic plan and risks discussed with patient.  I personally reviewed this patient with the CRNA. Discussed and agreed on the Anesthesia Plan with the CRNA..

## 2024-06-27 NOTE — ANESTHESIA POSTPROCEDURE EVALUATION
Post-Op Assessment Note    CV Status:  Stable  Pain Score: 0    Pain management: adequate       Mental Status:  Arousable and sleepy   Hydration Status:  Euvolemic   PONV Controlled:  Controlled   Airway Patency:  Patent     Post Op Vitals Reviewed: Yes    No anethesia notable event occurred.    Staff: CRNA               BP   121/64   Temp      Pulse 88   Resp 18   SpO2 98% RA

## 2024-08-06 DIAGNOSIS — Z30.09 VASECTOMY EVALUATION: Primary | ICD-10-CM

## 2024-09-16 NOTE — PROGRESS NOTES
Referring Physician: Farida Croft PA-C  A copy of this consultation note was communicated to the referring physician.       Diagnoses and all orders for this visit:    Vasectomy evaluation  -     Ambulatory Referral to Urology        Assessment and plan:       We had a long discussion regarding the options for birth control.  I told the patient that vasectomy is considered to be a permanent surgical sterilization procedure.  We spoke about other options including the possibility of vasectomy reversal at a later time.  He understands that vasectomy confers no immunity to STDs.  I also told him that according to our present knowledge, there is no causal relationship between vasectomy and subsequent development of prostate cancer or testicular cancer.  No change in libido erection or ejaculation.    We spoke about the potential complications.  The most common one in the short term is scrotal hematoma and infection, which rarely requires re-operation.  Additionally, he can react to the anesthetic, develop scrotal swelling, have pain or skin bruising.  We spoke about post procedure care to try to minimize this complication.  I also asked him to refrain from aspirin or fish oil products and alcohol prior to the procedure.  The long-term complications include but are not limited to vasectomy failure by recanalization, chronic epididymal discomfort, pain, among other possibilities.    I described to him how this procedure is normally performed in an office setting and he understands that if anesthesia is desired, this can be performed for him in an outpatient operative setting.  Finally, he understands that following vasectomy, he’ll need to use other means of birth control until he’s had semen analyses that demonstrate the absence of sperm.  He understands it will be his responsibility to submit these semen specimens and call our office for the results. I told him again that recanalization is a small but real  possibility, and if he is ever concerned about it he can submit another semen specimen for analysis.      After discussing the risks, benefits, possible complications and alternatives, informed consents were obtained.  Diazepam was prescribed, and review dosing, adverse effects and timing of the procedure.  He will return in the near future for the procedure.            Chief Complaint     Desire for vasectomy      History of Present Illness     Melanie Langford is a 38 y.o. male referred for evaluation of vasectomy.    He has 2 biological children.  He states that he and his partner have come to the mutual decision they do not desire any additional children.    He has no prior past medical, past surgical, or past urologic history    Detailed Urologic History     - please refer to HPI    Review of Systems     Review of Systems   Constitutional: Negative for activity change and fatigue.   HENT: Negative for congestion.    Eyes: Negative for visual disturbance.   Respiratory: Negative for shortness of breath and wheezing.    Cardiovascular: Negative for chest pain and leg swelling.   Gastrointestinal: Negative for abdominal pain.   Endocrine: Negative for polyuria.   Genitourinary: Negative for dysuria, flank pain, hematuria and urgency.   Musculoskeletal: Negative for back pain.   Allergic/Immunologic: Negative for immunocompromised state.   Neurological: Negative for dizziness and numbness.   Psychiatric/Behavioral: Negative for dysphoric mood.   All other systems reviewed and are negative.        Allergies     No Known Allergies    Physical Exam     Physical Exam   Constitutional: He is oriented to person, place, and time. He appears well-developed and well-nourished. No distress.   HENT:   Head: Normocephalic and atraumatic.   Eyes: EOM are normal.   Neck: Normal range of motion.   Cardiovascular:   Negative lower extremity edema   Pulmonary/Chest: Effort normal and breath sounds normal.   Abdominal: Soft.  "  Genitourinary:   Genitourinary Comments: Vas deferens are palpable bilaterally.   Musculoskeletal: Normal range of motion.   Neurological: He is alert and oriented to person, place, and time.   Skin: Skin is warm.   Psychiatric: He has a normal mood and affect. His behavior is normal.         Vital Signs  Vitals:    24 1406   BP: 132/78   BP Location: Left arm   Patient Position: Sitting   Cuff Size: Standard   Pulse: 70   Resp: 20   Temp: 97.8 °F (36.6 °C)   SpO2: 99%   Weight: 101 kg (223 lb 9.6 oz)   Height: 6' (1.829 m)         Current Medications       Current Outpatient Medications:     ALPRAZolam (XANAX) 1 mg tablet, Take 1 tablet, 1 hour prior to vasectomy, Disp: 1 tablet, Rfl: 0      Active Problems     Patient Active Problem List   Diagnosis    Nicotine abuse    Stress-related physiological response affecting physical condition    Sebaceous cyst    Class 1 obesity due to excess calories without serious comorbidity with body mass index (BMI) of 31.0 to 31.9 in adult    Acute diverticulitis         Past Medical History     Past Medical History:   Diagnosis Date    Acute diverticulitis 2024    Anxiety          Surgical History     Past Surgical History:   Procedure Laterality Date    IR DRAINAGE TUBE PLACEMENT  2024         Family History     Family History   Problem Relation Age of Onset    Hypertension Father          Social History     Social History     Social History     Tobacco Use   Smoking Status Former    Current packs/day: 0.00    Average packs/day: 0.3 packs/day for 8.0 years (2.0 ttl pk-yrs)    Types: Cigarettes    Quit date: 9/3/2022    Years since quittin.0    Passive exposure: Past   Smokeless Tobacco Never       Portions of the record may have been created with voice recognition software.  Occasional wrong word or \"sound a like\" substitutions may have occurred due to the inherent limitations of voice recognition software.  Read the chart carefully and recognize, using " context, where substitutions have occurred.

## 2024-09-17 ENCOUNTER — CONSULT (OUTPATIENT)
Dept: UROLOGY | Facility: CLINIC | Age: 39
End: 2024-09-17
Payer: COMMERCIAL

## 2024-09-17 VITALS
DIASTOLIC BLOOD PRESSURE: 78 MMHG | OXYGEN SATURATION: 99 % | HEART RATE: 70 BPM | BODY MASS INDEX: 30.28 KG/M2 | TEMPERATURE: 97.8 F | RESPIRATION RATE: 20 BRPM | WEIGHT: 223.6 LBS | SYSTOLIC BLOOD PRESSURE: 132 MMHG | HEIGHT: 72 IN

## 2024-09-17 DIAGNOSIS — Z30.09 VASECTOMY EVALUATION: ICD-10-CM

## 2024-09-17 PROCEDURE — 99204 OFFICE O/P NEW MOD 45 MIN: CPT | Performed by: PHYSICIAN ASSISTANT

## 2024-09-17 RX ORDER — ALPRAZOLAM 1 MG
TABLET ORAL
Qty: 1 TABLET | Refills: 0 | Status: SHIPPED | OUTPATIENT
Start: 2024-09-17

## 2024-10-13 ENCOUNTER — OFFICE VISIT (OUTPATIENT)
Dept: URGENT CARE | Facility: CLINIC | Age: 39
End: 2024-10-13
Payer: COMMERCIAL

## 2024-10-13 VITALS
TEMPERATURE: 97.1 F | RESPIRATION RATE: 16 BRPM | BODY MASS INDEX: 30.57 KG/M2 | DIASTOLIC BLOOD PRESSURE: 94 MMHG | SYSTOLIC BLOOD PRESSURE: 140 MMHG | HEART RATE: 80 BPM | OXYGEN SATURATION: 98 % | WEIGHT: 225.38 LBS

## 2024-10-13 DIAGNOSIS — K04.7 DENTAL INFECTION: Primary | ICD-10-CM

## 2024-10-13 PROCEDURE — S9088 SERVICES PROVIDED IN URGENT: HCPCS

## 2024-10-13 PROCEDURE — 99213 OFFICE O/P EST LOW 20 MIN: CPT

## 2024-10-13 NOTE — PATIENT INSTRUCTIONS
Take Augmentin with food as prescribed.   Tylenol/motrin as needed for pain.   Salt water gargles.  Ice over area.    Follow up with PCP  Follow up with dentist.  Proceed to the ER with worsening symptoms.

## 2024-10-13 NOTE — PROGRESS NOTES
"  [facility]        NAME: Melanie Langford is a 38 y.o. male  : 1985    MRN: [id number]  DATE: 2024  TIME: 1:39 PM    Assessment and Plan   Dental infection [K04.7]  1. Dental infection  amoxicillin-clavulanate (AUGMENTIN) 875-125 mg per tablet    Ambulatory Referral to Oral Maxillofacial Surgery            Patient Instructions     Take Augmentin with food as prescribed.   Tylenol/motrin as needed for pain.   Salt water gargles.  Ice over area.    Follow up with PCP  Follow up with dentist.  Proceed to the ER with worsening symptoms.     Chief Complaint     Chief Complaint   Patient presents with    Dental Pain     Broken tooth upper right jaw. Pain for 3 days, getting worse. Feeling \"inflamed\". No fever or chills. Taking acetaminophen with no relief.          History of Present Illness       The patient presents today with complaints of R upper dental pain and swelling since Friday. Denies any active drainage or abscess, fever/chills. He has a broken tooth that needs to be pulled on that side. Needs to schedule with [facility] to have removed. He has been taking tylenol as needed with minimal relief.         Review of Systems   Review of Systems   Constitutional:  Negative for chills and fever.   HENT:  Positive for dental problem.          Current Medications       Current Outpatient Medications:     amoxicillin-clavulanate (AUGMENTIN) 875-125 mg per tablet, Take 1 tablet by mouth every 12 (twelve) hours for 7 days, Disp: 14 tablet, Rfl: 0    ALPRAZolam (XANAX) 1 mg tablet, Take 1 tablet, 1 hour prior to vasectomy (Patient not taking: Reported on 10/13/2024), Disp: 1 tablet, Rfl: 0    Current Allergies     Allergies as of 10/13/2024    (No Known Allergies)            The following portions of the patient's history were reviewed and updated as appropriate: allergies, current medications, past family history, past medical history, past social history, past surgical history and problem list. "     Past Medical History:   Diagnosis Date    Acute diverticulitis 04/07/2024    Anxiety        Past Surgical History:   Procedure Laterality Date    IR DRAINAGE TUBE PLACEMENT  4/20/2024       Family History   Problem Relation Age of Onset    Hypertension Father          Medications have been verified.        Objective   /94   Pulse 80   Temp (!) 97.1 °F (36.2 °C)   Resp 16   Wt 102 kg (225 lb 6 oz)   SpO2 98%   BMI 30.57 kg/m²        Physical Exam     Physical Exam  Vitals and nursing note reviewed.   Constitutional:       General: He is not in acute distress.     Appearance: Normal appearance.   HENT:      Head: Normocephalic and atraumatic.      Right Ear: External ear normal.      Left Ear: External ear normal.      Mouth/Throat:      Mouth: Mucous membranes are moist.      Dentition: Abnormal dentition. Dental tenderness and dental caries present. No gingival swelling or dental abscesses.      Comments: R upper front molar broken off. No abscess noted, or surrounding erythema or swelling. Mild facial swelling noted to R side of lower cheek.   Eyes:      Pupils: Pupils are equal, round, and reactive to light.   Cardiovascular:      Rate and Rhythm: Normal rate.   Pulmonary:      Effort: Pulmonary effort is normal.   Skin:     General: Skin is warm and dry.   Neurological:      Mental Status: He is alert and oriented to person, place, and time. Mental status is at baseline.   Psychiatric:         Mood and Affect: Mood normal.         Behavior: Behavior normal.

## 2024-11-05 ENCOUNTER — PROCEDURE VISIT (OUTPATIENT)
Dept: UROLOGY | Facility: MEDICAL CENTER | Age: 39
End: 2024-11-05
Payer: COMMERCIAL

## 2024-11-05 VITALS
HEART RATE: 76 BPM | WEIGHT: 225 LBS | OXYGEN SATURATION: 99 % | DIASTOLIC BLOOD PRESSURE: 110 MMHG | BODY MASS INDEX: 30.48 KG/M2 | HEIGHT: 72 IN | SYSTOLIC BLOOD PRESSURE: 150 MMHG

## 2024-11-05 DIAGNOSIS — Z30.2 ENCOUNTER FOR VASECTOMY: Primary | ICD-10-CM

## 2024-11-05 PROCEDURE — 88302 TISSUE EXAM BY PATHOLOGIST: CPT | Performed by: STUDENT IN AN ORGANIZED HEALTH CARE EDUCATION/TRAINING PROGRAM

## 2024-11-05 PROCEDURE — 55250 REMOVAL OF SPERM DUCT(S): CPT | Performed by: UROLOGY

## 2024-11-05 NOTE — PROGRESS NOTES
"    Vasectomy     Date/Time  11/5/2024 2:00 PM     Performed by  Latrell Pascual MD   Authorized by  Latrell Pascual MD     Universal Protocol   procedure performed by consultantConsent: Verbal consent obtained. Written consent obtained.  Risks and benefits: risks, benefits and alternatives were discussed  Consent given by: patient  Time out: Immediately prior to procedure a \"time out\" was called to verify the correct patient, procedure, equipment, support staff and site/side marked as required.  Timeout called at: 11/5/2024 2:02 PM.  Patient understanding: patient states understanding of the procedure being performed  Patient consent: the patient's understanding of the procedure matches consent given  Procedure consent: procedure consent matches procedure scheduled  Relevant documents: relevant documents present and verified  Test results: test results available and properly labeled  Site marked: the operative site was marked  Radiology Images displayed and confirmed. If images not available, report reviewed: imaging studies available  Required items: required blood products, implants, devices, and special equipment available  Patient identity confirmed: verbally with patient      Local anesthesia used: yes      Anesthesia: local infiltration     Anesthesia   Local anesthesia used: yes  Local Anesthetic: lidocaine 2% without epinephrine  Anesthetic total: 20 mL     Sedation   Patient sedated: yes  Sedation type: anxiolysis        Specimen: yes    Culture: no   Procedure Details   Procedure Notes: Procedures      Vasectomy Procedure Note    Indications: 38 y.o.  y.o. male desiring permanent sterilization    Pre-operative Diagnosis: Undesired fertility    Post-operative Diagnosis: Undesired fertility    Anesthesia: Lidocaine 2% without epinephrine      Procedure Details     Risks and benefits of vasectomy were previously discussed. Informed consent was obtained at his prior office visit. The patient had taken a " benzodiazepine as prescribed for anxiolysis and he had showered the morning of the procedure and clipped excess pubic hair.  Music of the patient's choosing was also played for additional anxiolysis.    The patient was placed in the supine position. His genitalia were prepped and draped in the usual sterile fashion.     The left vas deferens was grasped and presented to the area of interest on the left hemiscrotum. Next, 2% lidocaine was used to anesthetize the skin, subcutaneous tissue, and left vas deferens. The left vas deferens was grasped and presented into the surgical field with a vas clamp.   A #15 blade was used to make a longitudinal incision in the sheath of the vas deferens. The vas itself was then dissected free from the surrounding tissue. Clamps were placed proximally and distally and a 1 cm segment of the vas deferens was excised. Both small clips and silk ties were applied and the exposed ends were fulgurated as was the lumen of the vas. Hemostasis was excellent. The vas was returned to its natural anatomic position after ensuring meticulous hemostasis.  A single interrupted stitch of 4-0 chromic was placed through the skin and dartos to reapproximate.    The right vas deferens was then grasped and presented to the area of interest on the right hemiscrotum. Next, 2% lidocaine was used to anesthetize the skin, subcutaneous tissue, and right vas deferens. The right vas deferens was grasped and presented into the surgical field with a vas clamp.   A #15 blade was used to make a longitudinal incision in the sheath of the vas deferens. The vas itself was then dissected free from the surrounding tissue. Clamps were placed proximally and distally and a 1 cm segment of the vas deferens was excised. Both small clips and silk ties were applied and the exposed ends were fulgurated as was the lumen of the vas. Hemostasis was excellent. The vas was returned to its natural anatomic position after ensuring  meticulous hemostasis.  A single interrupted stitch of 4-0 chromic was placed through the skin and dartos to reapproximate the defect.      Specimen:   1.  Right vas deferens  2.  Left vas deferens    Condition: Stable    Complications: none    Plan:      He did very well with the procedure today. Postprocedural instructions were provided. He will follow-up PRN. He will continue to use any form of birth control that he is currently using until his sterility is confirmed             Patient Transportation: confirmed  Patient tolerance: patient tolerated the procedure well with no immediate complications

## 2024-11-07 PROCEDURE — 88302 TISSUE EXAM BY PATHOLOGIST: CPT | Performed by: STUDENT IN AN ORGANIZED HEALTH CARE EDUCATION/TRAINING PROGRAM

## 2024-12-11 ENCOUNTER — OFFICE VISIT (OUTPATIENT)
Dept: URGENT CARE | Facility: CLINIC | Age: 39
End: 2024-12-11
Payer: COMMERCIAL

## 2024-12-11 VITALS
RESPIRATION RATE: 18 BRPM | SYSTOLIC BLOOD PRESSURE: 140 MMHG | OXYGEN SATURATION: 99 % | HEART RATE: 90 BPM | DIASTOLIC BLOOD PRESSURE: 92 MMHG | TEMPERATURE: 97.8 F

## 2024-12-11 DIAGNOSIS — K04.7 DENTAL INFECTION: Primary | ICD-10-CM

## 2024-12-11 PROCEDURE — S9088 SERVICES PROVIDED IN URGENT: HCPCS

## 2024-12-11 PROCEDURE — 99213 OFFICE O/P EST LOW 20 MIN: CPT

## 2024-12-11 NOTE — PATIENT INSTRUCTIONS
Take entire course of antibiotics - do not stop even if you are feeling better.  Follow up with dentist as soon as you are able.   Take ibuprofen (Aleve, Motrin, etc) as needed for pain.   Follow up with PCP in 3-5 days.  Proceed to ER if symptoms worsen - fever, severe pain, or drainage to the area.

## 2024-12-11 NOTE — PROGRESS NOTES
[facility]    NAME: Melanie Langford is a 39 y.o. male  : 1985    MRN: [id number]  DATE: 2024  TIME: 4:39 PM    Assessment and Plan   Dental infection [K04.7]  1. Dental infection  amoxicillin-clavulanate (AUGMENTIN) 875-125 mg per tablet          Started on antibiotics for presumed infection.  Educated to take entire course of antibiotics.  Take ibuprofen (Aleve, Motrin, etc) as needed for pain.   Encouraged to follow up with dentist as soon as possible.       Patient Instructions     Take entire course of antibiotics - do not stop even if you are feeling better.  Follow up with dentist as soon as you are able.   Take ibuprofen (Aleve, Motrin, etc) as needed for pain.   Follow up with PCP in 3-5 days.  Proceed to ER if symptoms worsen - fever, severe pain, or drainage to the area.     Chief Complaint     Chief Complaint   Patient presents with    Dental Pain     Dental pain started yesterday.   Has appt with dentist on Friday.   However pain is bother him.          History of Present Illness       Presents with dental pain that began yesterday. Has appointment with dentist on Friday (2 days) but pain got worse. Pain to right upper tooth. Concern for infection. Lymph node elevation present and mild facial redness/swelling.         Review of Systems   Review of Systems   Constitutional:  Negative for chills and fever.   HENT:  Positive for dental problem and facial swelling. Negative for ear pain and sore throat.    Respiratory:  Negative for cough and shortness of breath.    Cardiovascular:  Negative for chest pain and palpitations.   Gastrointestinal:  Negative for diarrhea, nausea and vomiting.   Musculoskeletal:  Negative for myalgias.   Neurological:  Negative for headaches.   All other systems reviewed and are negative.        Current Medications       Current Outpatient Medications:     amoxicillin-clavulanate (AUGMENTIN) 875-125 mg per tablet, Take 1 tablet by mouth every 12  (twelve) hours for 7 days, Disp: 14 tablet, Rfl: 0    ALPRAZolam (XANAX) 1 mg tablet, Take 1 tablet, 1 hour prior to vasectomy, Disp: 1 tablet, Rfl: 0    Current Allergies     Allergies as of 12/11/2024    (No Known Allergies)            The following portions of the patient's history were reviewed and updated as appropriate: allergies, current medications, past family history, past medical history, past social history, past surgical history and problem list.     Past Medical History:   Diagnosis Date    Acute diverticulitis 04/07/2024    Anxiety        Past Surgical History:   Procedure Laterality Date    IR DRAINAGE TUBE PLACEMENT  4/20/2024       Family History   Problem Relation Age of Onset    Hypertension Father          Medications have been verified.        Objective   /92   Pulse 90   Temp 97.8 °F (36.6 °C)   Resp 18   SpO2 99%        Physical Exam     Physical Exam  Vitals reviewed.   Constitutional:       General: He is not in acute distress.     Appearance: Normal appearance.   HENT:      Right Ear: Tympanic membrane, ear canal and external ear normal.      Left Ear: Tympanic membrane, ear canal and external ear normal.      Nose: Nose normal.      Mouth/Throat:      Mouth: Mucous membranes are moist.      Dentition: Abnormal dentition. Dental tenderness and dental caries present.      Pharynx: No posterior oropharyngeal erythema.      Tonsils: No tonsillar exudate. 0 on the right. 0 on the left.        Comments: 1 - tooth with black caries and swelling, erythema present.   Eyes:      Conjunctiva/sclera: Conjunctivae normal.   Cardiovascular:      Rate and Rhythm: Normal rate and regular rhythm.      Pulses: Normal pulses.      Heart sounds: Normal heart sounds. No murmur heard.  Pulmonary:      Effort: Pulmonary effort is normal. No respiratory distress.      Breath sounds: Normal breath sounds.   Skin:     General: Skin is warm and dry.   Neurological:      General: No focal deficit present.       Mental Status: He is alert and oriented to person, place, and time.   Psychiatric:         Mood and Affect: Mood normal.         Behavior: Behavior normal.

## 2025-02-19 ENCOUNTER — OFFICE VISIT (OUTPATIENT)
Dept: FAMILY MEDICINE CLINIC | Facility: CLINIC | Age: 40
End: 2025-02-19
Payer: COMMERCIAL

## 2025-02-19 VITALS
OXYGEN SATURATION: 98 % | TEMPERATURE: 100.1 F | SYSTOLIC BLOOD PRESSURE: 138 MMHG | BODY MASS INDEX: 30.34 KG/M2 | DIASTOLIC BLOOD PRESSURE: 100 MMHG | HEART RATE: 108 BPM | WEIGHT: 224 LBS | HEIGHT: 72 IN

## 2025-02-19 DIAGNOSIS — J10.1 INFLUENZA A: Primary | ICD-10-CM

## 2025-02-19 DIAGNOSIS — R68.83 CHILLS: ICD-10-CM

## 2025-02-19 DIAGNOSIS — R50.9 FEVER, UNSPECIFIED FEVER CAUSE: ICD-10-CM

## 2025-02-19 DIAGNOSIS — R09.81 NASAL CONGESTION: ICD-10-CM

## 2025-02-19 DIAGNOSIS — R53.83 OTHER FATIGUE: ICD-10-CM

## 2025-02-19 LAB
SL AMB POCT RAPID FLU A: POSITIVE
SL AMB POCT RAPID FLU B: NEGATIVE

## 2025-02-19 PROCEDURE — 87804 INFLUENZA ASSAY W/OPTIC: CPT | Performed by: PHYSICIAN ASSISTANT

## 2025-02-19 PROCEDURE — 99213 OFFICE O/P EST LOW 20 MIN: CPT | Performed by: PHYSICIAN ASSISTANT

## 2025-02-19 RX ORDER — OSELTAMIVIR PHOSPHATE 75 MG/1
75 CAPSULE ORAL EVERY 12 HOURS SCHEDULED
Qty: 10 CAPSULE | Refills: 0 | Status: SHIPPED | OUTPATIENT
Start: 2025-02-19 | End: 2025-02-24

## 2025-02-19 NOTE — PROGRESS NOTES
Name: Melanie Langford      : 1985      MRN: [id number]  Encounter Provider: Kevin Cook PA-C  Encounter Date: 2025   Encounter department: [facility]    Assessment & Plan  Influenza A  Rapid flu in office is +  Begin tamilfu   Tylenol/ibuprofen for pain/fever control  Otc cough/cold medication for sx control  Orders:  •  oseltamivir (TAMIFLU) 75 mg capsule; Take 1 capsule (75 mg total) by mouth every 12 (twelve) hours for 5 days    Fever, unspecified fever cause    Orders:  •  POCT rapid flu A and B    Nasal congestion    Orders:  •  POCT rapid flu A and B    Chills    Orders:  •  POCT rapid flu A and B    Other fatigue    Orders:  •  POCT rapid flu A and B         History of Present Illness     Pt presents with a day of congestion, fever, chills, fatigue. Home flu test was positive. No SOB or GI sx      Review of Systems   Constitutional:  Positive for fatigue. Negative for chills and fever.   HENT:  Positive for congestion. Negative for ear pain, hearing loss, nosebleeds, postnasal drip, rhinorrhea, sinus pressure, sinus pain, sneezing and sore throat.    Eyes:  Negative for pain, discharge, itching and visual disturbance.   Respiratory:  Positive for cough. Negative for chest tightness, shortness of breath and wheezing.    Cardiovascular:  Negative for chest pain, palpitations and leg swelling.   Gastrointestinal:  Negative for abdominal pain, blood in stool, constipation, diarrhea, nausea and vomiting.   Genitourinary:  Negative for frequency and urgency.   Neurological:  Positive for headaches. Negative for dizziness, light-headedness and numbness.     Past Medical History:   Diagnosis Date   • Acute diverticulitis 2024   • Anxiety      Past Surgical History:   Procedure Laterality Date   • IR DRAINAGE TUBE PLACEMENT  2024     Family History   Problem Relation Age of Onset   • Hypertension Father      Social History     Tobacco Use   • Smoking status: Former      Current packs/day: 0.00     Average packs/day: 0.3 packs/day for 8.0 years (2.0 ttl pk-yrs)     Types: Cigarettes     Quit date: 9/3/2022     Years since quittin.4     Passive exposure: Past   • Smokeless tobacco: Never   Vaping Use   • Vaping status: Never Used   Substance and Sexual Activity   • Alcohol use: Yes     Comment: occ   • Drug use: Not Currently   • Sexual activity: Yes     Current Outpatient Medications on File Prior to Visit   Medication Sig   • [DISCONTINUED] ALPRAZolam (XANAX) 1 mg tablet Take 1 tablet, 1 hour prior to vasectomy     No Known Allergies  Immunization History   Administered Date(s) Administered   • COVID-19 PFIZER VACCINE 0.3 ML IM 2021, 2021, 2021   • Td (adult), adsorbed 2002     Objective   /100 (BP Location: Left arm, Patient Position: Sitting, Cuff Size: Large)   Pulse (!) 108   Temp 100.1 °F (37.8 °C)   Ht 6' (1.829 m)   Wt 102 kg (224 lb)   SpO2 98%   BMI 30.38 kg/m²     Physical Exam  Vitals and nursing note reviewed.   Constitutional:       General: He is not in acute distress.     Appearance: He is well-developed.   HENT:      Head: Normocephalic and atraumatic.      Nose: Congestion present.      Mouth/Throat:      Mouth: Mucous membranes are moist.      Pharynx: Oropharynx is clear.   Cardiovascular:      Rate and Rhythm: Normal rate and regular rhythm.      Heart sounds: No murmur heard.  Pulmonary:      Effort: Pulmonary effort is normal. No respiratory distress.      Breath sounds: Normal breath sounds. No wheezing, rhonchi or rales.   Musculoskeletal:         General: No swelling.      Cervical back: Neck supple.   Skin:     General: Skin is warm and dry.      Capillary Refill: Capillary refill takes less than 2 seconds.   Neurological:      Mental Status: He is alert.